# Patient Record
Sex: FEMALE | Race: WHITE | NOT HISPANIC OR LATINO | URBAN - METROPOLITAN AREA
[De-identification: names, ages, dates, MRNs, and addresses within clinical notes are randomized per-mention and may not be internally consistent; named-entity substitution may affect disease eponyms.]

---

## 2021-11-04 ENCOUNTER — OFFICE VISIT (OUTPATIENT)
Dept: PRIMARY CARE CLINIC | Facility: CLINIC | Age: 36
End: 2021-11-04
Payer: MEDICAID

## 2021-11-04 VITALS
DIASTOLIC BLOOD PRESSURE: 72 MMHG | SYSTOLIC BLOOD PRESSURE: 122 MMHG | WEIGHT: 293 LBS | OXYGEN SATURATION: 98 % | HEART RATE: 108 BPM | RESPIRATION RATE: 18 BRPM | BODY MASS INDEX: 43.4 KG/M2 | HEIGHT: 69 IN

## 2021-11-04 DIAGNOSIS — Z11.4 SCREENING FOR HIV (HUMAN IMMUNODEFICIENCY VIRUS): ICD-10-CM

## 2021-11-04 DIAGNOSIS — E66.01 CLASS 3 SEVERE OBESITY DUE TO EXCESS CALORIES WITHOUT SERIOUS COMORBIDITY WITH BODY MASS INDEX (BMI) OF 60.0 TO 69.9 IN ADULT: ICD-10-CM

## 2021-11-04 DIAGNOSIS — Z01.419 WELL WOMAN EXAM WITH ROUTINE GYNECOLOGICAL EXAM: ICD-10-CM

## 2021-11-04 DIAGNOSIS — Z00.00 ANNUAL PHYSICAL EXAM: Primary | ICD-10-CM

## 2021-11-04 DIAGNOSIS — Z11.59 NEED FOR HEPATITIS C SCREENING TEST: ICD-10-CM

## 2021-11-04 PROCEDURE — 99385 PREV VISIT NEW AGE 18-39: CPT | Mod: S$GLB,,, | Performed by: NURSE PRACTITIONER

## 2021-11-04 PROCEDURE — 99385 PR PREVENTIVE VISIT,NEW,18-39: ICD-10-PCS | Mod: S$GLB,,, | Performed by: NURSE PRACTITIONER

## 2021-11-04 RX ORDER — TETANUS TOXOID, REDUCED DIPHTHERIA TOXOID AND ACELLULAR PERTUSSIS VACCINE, ADSORBED 5; 2.5; 8; 8; 2.5 [IU]/.5ML; [IU]/.5ML; UG/.5ML; UG/.5ML; UG/.5ML
SUSPENSION INTRAMUSCULAR
COMMUNITY
End: 2022-01-31

## 2021-11-06 LAB
ALBUMIN SERPL-MCNC: 4.2 G/DL (ref 3.6–5.1)
ALBUMIN/GLOB SERPL: 1.4 (CALC) (ref 1–2.5)
ALP SERPL-CCNC: 73 U/L (ref 31–125)
ALT SERPL-CCNC: 11 U/L (ref 6–29)
AST SERPL-CCNC: 17 U/L (ref 10–30)
BASOPHILS # BLD AUTO: 32 CELLS/UL (ref 0–200)
BASOPHILS NFR BLD AUTO: 0.4 %
BILIRUB SERPL-MCNC: 0.4 MG/DL (ref 0.2–1.2)
BUN SERPL-MCNC: 15 MG/DL (ref 7–25)
BUN/CREAT SERPL: NORMAL (CALC) (ref 6–22)
CALCIUM SERPL-MCNC: 9.4 MG/DL (ref 8.6–10.2)
CHLORIDE SERPL-SCNC: 102 MMOL/L (ref 98–110)
CHOLEST SERPL-MCNC: 191 MG/DL
CHOLEST/HDLC SERPL: 3.2 (CALC)
CO2 SERPL-SCNC: 25 MMOL/L (ref 20–32)
CREAT SERPL-MCNC: 0.88 MG/DL (ref 0.5–1.1)
EOSINOPHIL # BLD AUTO: 71 CELLS/UL (ref 15–500)
EOSINOPHIL NFR BLD AUTO: 0.9 %
ERYTHROCYTE [DISTWIDTH] IN BLOOD BY AUTOMATED COUNT: 15 % (ref 11–15)
GLOBULIN SER CALC-MCNC: 3.1 G/DL (CALC) (ref 1.9–3.7)
GLUCOSE SERPL-MCNC: 92 MG/DL (ref 65–99)
HCT VFR BLD AUTO: 37.7 % (ref 35–45)
HCV AB S/CO SERPL IA: 0.02
HCV AB SERPL QL IA: NORMAL
HDLC SERPL-MCNC: 60 MG/DL
HGB BLD-MCNC: 12.3 G/DL (ref 11.7–15.5)
HIV 1+2 AB+HIV1 P24 AG SERPL QL IA: NORMAL
LDLC SERPL CALC-MCNC: 110 MG/DL (CALC)
LYMPHOCYTES # BLD AUTO: 1691 CELLS/UL (ref 850–3900)
LYMPHOCYTES NFR BLD AUTO: 21.4 %
MCH RBC QN AUTO: 26.6 PG (ref 27–33)
MCHC RBC AUTO-ENTMCNC: 32.6 G/DL (ref 32–36)
MCV RBC AUTO: 81.6 FL (ref 80–100)
MONOCYTES # BLD AUTO: 332 CELLS/UL (ref 200–950)
MONOCYTES NFR BLD AUTO: 4.2 %
NEUTROPHILS # BLD AUTO: 5775 CELLS/UL (ref 1500–7800)
NEUTROPHILS NFR BLD AUTO: 73.1 %
NONHDLC SERPL-MCNC: 131 MG/DL (CALC)
PLATELET # BLD AUTO: 214 THOUSAND/UL (ref 140–400)
PMV BLD REES-ECKER: 11.7 FL (ref 7.5–12.5)
POTASSIUM SERPL-SCNC: 4.4 MMOL/L (ref 3.5–5.3)
PROT SERPL-MCNC: 7.3 G/DL (ref 6.1–8.1)
RBC # BLD AUTO: 4.62 MILLION/UL (ref 3.8–5.1)
SODIUM SERPL-SCNC: 138 MMOL/L (ref 135–146)
TRIGL SERPL-MCNC: 104 MG/DL
TSH SERPL-ACNC: 3.46 MIU/L
WBC # BLD AUTO: 7.9 THOUSAND/UL (ref 3.8–10.8)

## 2021-11-09 ENCOUNTER — TELEPHONE (OUTPATIENT)
Dept: PRIMARY CARE CLINIC | Facility: CLINIC | Age: 36
End: 2021-11-09
Payer: MEDICAID

## 2021-12-01 ENCOUNTER — OFFICE VISIT (OUTPATIENT)
Dept: OBSTETRICS AND GYNECOLOGY | Facility: CLINIC | Age: 36
End: 2021-12-01
Payer: MEDICAID

## 2021-12-01 VITALS
WEIGHT: 293 LBS | SYSTOLIC BLOOD PRESSURE: 144 MMHG | HEIGHT: 69 IN | BODY MASS INDEX: 43.4 KG/M2 | DIASTOLIC BLOOD PRESSURE: 87 MMHG | HEART RATE: 108 BPM

## 2021-12-01 DIAGNOSIS — Z01.419 WELL WOMAN EXAM WITH ROUTINE GYNECOLOGICAL EXAM: Primary | ICD-10-CM

## 2021-12-01 DIAGNOSIS — Z86.718 PERSONAL HISTORY OF DVT (DEEP VEIN THROMBOSIS): ICD-10-CM

## 2021-12-01 DIAGNOSIS — E66.01 CLASS 3 SEVERE OBESITY DUE TO EXCESS CALORIES WITH BODY MASS INDEX (BMI) OF 60.0 TO 69.9 IN ADULT, UNSPECIFIED WHETHER SERIOUS COMORBIDITY PRESENT: ICD-10-CM

## 2021-12-01 DIAGNOSIS — E88.810 METABOLIC SYNDROME: ICD-10-CM

## 2021-12-01 DIAGNOSIS — N92.6 IRREGULAR MENSES: ICD-10-CM

## 2021-12-01 DIAGNOSIS — R87.613 HIGH GRADE SQUAMOUS INTRAEPITHELIAL LESION (HGSIL) ON CYTOLOGIC SMEAR OF CERVIX: ICD-10-CM

## 2021-12-01 PROBLEM — I82.403 DEEP VEIN THROMBOSIS (DVT) OF BOTH LOWER EXTREMITIES: Status: ACTIVE | Noted: 2021-12-01

## 2021-12-01 PROBLEM — E66.813 CLASS 3 SEVERE OBESITY DUE TO EXCESS CALORIES WITH BODY MASS INDEX (BMI) OF 60.0 TO 69.9 IN ADULT: Status: ACTIVE | Noted: 2021-12-01

## 2021-12-01 LAB
B-HCG UR QL: NEGATIVE
CTP QC/QA: YES

## 2021-12-01 PROCEDURE — 99385 PREV VISIT NEW AGE 18-39: CPT | Mod: S$GLB,,, | Performed by: NURSE PRACTITIONER

## 2021-12-01 PROCEDURE — 81025 POCT URINE PREGNANCY: ICD-10-PCS | Mod: S$GLB,,, | Performed by: NURSE PRACTITIONER

## 2021-12-01 PROCEDURE — 81025 URINE PREGNANCY TEST: CPT | Mod: S$GLB,,, | Performed by: NURSE PRACTITIONER

## 2021-12-01 PROCEDURE — 99385 PR PREVENTIVE VISIT,NEW,18-39: ICD-10-PCS | Mod: S$GLB,,, | Performed by: NURSE PRACTITIONER

## 2021-12-01 RX ORDER — METFORMIN HYDROCHLORIDE 500 MG/1
500 TABLET ORAL DAILY
Qty: 30 TABLET | Refills: 11 | Status: SHIPPED | OUTPATIENT
Start: 2021-12-01 | End: 2022-01-31 | Stop reason: SDUPTHER

## 2021-12-01 RX ORDER — LEVOTHYROXINE SODIUM 25 UG/1
25 TABLET ORAL
Qty: 30 TABLET | Refills: 11 | Status: SHIPPED | OUTPATIENT
Start: 2021-12-01 | End: 2024-01-02

## 2022-01-12 ENCOUNTER — PROCEDURE VISIT (OUTPATIENT)
Dept: OBSTETRICS AND GYNECOLOGY | Facility: CLINIC | Age: 37
End: 2022-01-12
Payer: MEDICAID

## 2022-01-12 VITALS
WEIGHT: 293 LBS | BODY MASS INDEX: 43.4 KG/M2 | DIASTOLIC BLOOD PRESSURE: 88 MMHG | HEART RATE: 99 BPM | HEIGHT: 69 IN | SYSTOLIC BLOOD PRESSURE: 144 MMHG

## 2022-01-12 DIAGNOSIS — R87.611 ATYPICAL SQUAMOUS CELLS CANNOT EXCLUDE HIGH GRADE SQUAMOUS INTRAEPITHELIAL LESION ON CYTOLOGIC SMEAR OF CERVIX (ASC-H): ICD-10-CM

## 2022-01-12 DIAGNOSIS — R87.613 HIGH GRADE SQUAMOUS INTRAEPITHELIAL LESION (HGSIL) ON CYTOLOGIC SMEAR OF CERVIX: ICD-10-CM

## 2022-01-12 DIAGNOSIS — Z32.02 NEGATIVE PREGNANCY TEST: Primary | ICD-10-CM

## 2022-01-12 LAB
B-HCG UR QL: NEGATIVE
CTP QC/QA: YES

## 2022-01-12 PROCEDURE — 99499 NO LOS: ICD-10-PCS | Mod: S$GLB,,, | Performed by: NURSE PRACTITIONER

## 2022-01-12 PROCEDURE — 57456 COLPOSCOPY: ICD-10-PCS | Mod: S$GLB,,, | Performed by: NURSE PRACTITIONER

## 2022-01-12 PROCEDURE — 81025 URINE PREGNANCY TEST: CPT | Mod: S$GLB,,, | Performed by: NURSE PRACTITIONER

## 2022-01-12 PROCEDURE — 99499 UNLISTED E&M SERVICE: CPT | Mod: S$GLB,,, | Performed by: NURSE PRACTITIONER

## 2022-01-12 PROCEDURE — 57456 ENDOCERV CURETTAGE W/SCOPE: CPT | Mod: S$GLB,,, | Performed by: NURSE PRACTITIONER

## 2022-01-12 PROCEDURE — 81025 POCT URINE PREGNANCY: ICD-10-PCS | Mod: S$GLB,,, | Performed by: NURSE PRACTITIONER

## 2022-01-12 NOTE — PROCEDURES
Colposcopy    Date/Time: 1/12/2022 9:00 AM  Performed by: Ayana Escalante NP  Authorized by: Ayana Escalante NP     Consent Done?:  Yes (Verbal) and Yes (Written)  Assistants?: Yes    List of assistants:  Fanny Huddleston I was present for the entire procedure.    Colposcopy Site:  Cervix  Position:  Supine   Patient was prepped and draped in the normal sterile fashion.  Acrowhite Lesion: No    Atypical Vessels: No    Transformation Zone Adequate?: Yes    Biopsy?: No    ECC Performed?: Yes    LEEP Performed?: No    Estimated blood loss (cc):  0   Patient tolerated the procedure well with no immediate complications.   Post-operative instructions were provided for the patient.   Patient was discharged and will follow up if any complications occur     Pt has a HX of abnormal PAP's now for over 10 yrs  Colpo times 2 in the past that returned WNL  2011 LG = HR HPV +, colpo WNL  2019 HGSIL while pregnant, Colpo done and endocervical polyp with benign findings  2021 ASCUS can not R/O HG with neg HPV     Metaplasia noted, but no significant lesions ID'd   ECC collected and will pap in one yr

## 2022-01-23 ENCOUNTER — PATIENT MESSAGE (OUTPATIENT)
Dept: OBSTETRICS AND GYNECOLOGY | Facility: CLINIC | Age: 37
End: 2022-01-23
Payer: MEDICAID

## 2022-01-31 ENCOUNTER — OFFICE VISIT (OUTPATIENT)
Dept: OBSTETRICS AND GYNECOLOGY | Facility: CLINIC | Age: 37
End: 2022-01-31
Payer: MEDICAID

## 2022-01-31 VITALS
WEIGHT: 293 LBS | SYSTOLIC BLOOD PRESSURE: 139 MMHG | DIASTOLIC BLOOD PRESSURE: 83 MMHG | HEIGHT: 69 IN | HEART RATE: 70 BPM | BODY MASS INDEX: 43.4 KG/M2

## 2022-01-31 DIAGNOSIS — E66.01 CLASS 3 SEVERE OBESITY DUE TO EXCESS CALORIES WITH BODY MASS INDEX (BMI) OF 60.0 TO 69.9 IN ADULT, UNSPECIFIED WHETHER SERIOUS COMORBIDITY PRESENT: ICD-10-CM

## 2022-01-31 DIAGNOSIS — N92.0 MENORRHAGIA WITH REGULAR CYCLE: ICD-10-CM

## 2022-01-31 DIAGNOSIS — N92.6 IRREGULAR MENSTRUAL BLEEDING: Primary | ICD-10-CM

## 2022-01-31 DIAGNOSIS — F33.1 MODERATE EPISODE OF RECURRENT MAJOR DEPRESSIVE DISORDER: ICD-10-CM

## 2022-01-31 DIAGNOSIS — Z86.718 HISTORY OF RECURRENT DEEP VEIN THROMBOSIS (DVT): ICD-10-CM

## 2022-01-31 DIAGNOSIS — E88.810 METABOLIC SYNDROME: ICD-10-CM

## 2022-01-31 DIAGNOSIS — E03.9 HYPOTHYROIDISM, UNSPECIFIED TYPE: ICD-10-CM

## 2022-01-31 PROCEDURE — 3075F PR MOST RECENT SYSTOLIC BLOOD PRESS GE 130-139MM HG: ICD-10-PCS | Mod: CPTII,S$GLB,, | Performed by: NURSE PRACTITIONER

## 2022-01-31 PROCEDURE — 1159F MED LIST DOCD IN RCRD: CPT | Mod: CPTII,S$GLB,, | Performed by: NURSE PRACTITIONER

## 2022-01-31 PROCEDURE — 3079F DIAST BP 80-89 MM HG: CPT | Mod: CPTII,S$GLB,, | Performed by: NURSE PRACTITIONER

## 2022-01-31 PROCEDURE — 99214 PR OFFICE/OUTPT VISIT, EST, LEVL IV, 30-39 MIN: ICD-10-PCS | Mod: S$GLB,,, | Performed by: NURSE PRACTITIONER

## 2022-01-31 PROCEDURE — 99214 OFFICE O/P EST MOD 30 MIN: CPT | Mod: S$GLB,,, | Performed by: NURSE PRACTITIONER

## 2022-01-31 PROCEDURE — 1160F PR REVIEW ALL MEDS BY PRESCRIBER/CLIN PHARMACIST DOCUMENTED: ICD-10-PCS | Mod: CPTII,S$GLB,, | Performed by: NURSE PRACTITIONER

## 2022-01-31 PROCEDURE — 3075F SYST BP GE 130 - 139MM HG: CPT | Mod: CPTII,S$GLB,, | Performed by: NURSE PRACTITIONER

## 2022-01-31 PROCEDURE — 3079F PR MOST RECENT DIASTOLIC BLOOD PRESSURE 80-89 MM HG: ICD-10-PCS | Mod: CPTII,S$GLB,, | Performed by: NURSE PRACTITIONER

## 2022-01-31 PROCEDURE — 3008F BODY MASS INDEX DOCD: CPT | Mod: CPTII,S$GLB,, | Performed by: NURSE PRACTITIONER

## 2022-01-31 PROCEDURE — 1160F RVW MEDS BY RX/DR IN RCRD: CPT | Mod: CPTII,S$GLB,, | Performed by: NURSE PRACTITIONER

## 2022-01-31 PROCEDURE — 1159F PR MEDICATION LIST DOCUMENTED IN MEDICAL RECORD: ICD-10-PCS | Mod: CPTII,S$GLB,, | Performed by: NURSE PRACTITIONER

## 2022-01-31 PROCEDURE — 3008F PR BODY MASS INDEX (BMI) DOCUMENTED: ICD-10-PCS | Mod: CPTII,S$GLB,, | Performed by: NURSE PRACTITIONER

## 2022-01-31 RX ORDER — METFORMIN HYDROCHLORIDE 500 MG/1
500 TABLET ORAL 2 TIMES DAILY WITH MEALS
Qty: 30 TABLET | Refills: 11 | Status: SHIPPED | OUTPATIENT
Start: 2022-01-31 | End: 2022-08-22

## 2022-01-31 RX ORDER — ASPIRIN 81 MG/1
81 TABLET ORAL DAILY
Qty: 150 TABLET | Refills: 2 | Status: SHIPPED | OUTPATIENT
Start: 2022-01-31 | End: 2024-01-02

## 2022-01-31 RX ORDER — FLUOXETINE 10 MG/1
10 CAPSULE ORAL DAILY
Qty: 30 CAPSULE | Refills: 2 | Status: SHIPPED | OUTPATIENT
Start: 2022-01-31 | End: 2022-04-25

## 2022-01-31 NOTE — PROGRESS NOTES
"  Subjective:       Patient ID: Viktoria Westfall is a 36 y.o. female.    Chief Complaint:  Menstrual Problem      History of Present Illness  Pt here for follow up in POC, taking the metformin without any issues, has lost 5#. Diet and exercise reviewed.  History and past labs reviewed with patient.  Discussed Depression HX, life long since starting cycles and has had episodes with each pregnancy   Complaints: cycles are lasting 14 days and coming monthly.       Review of Systems  Review of Systems   Genitourinary: Positive for menstrual problem.           Objective:     Vitals:    01/31/22 0855   BP: 139/83   Pulse: 70   Weight: (!) 187.7 kg (413 lb 12.8 oz)   Height: 5' 9" (1.753 m)        Physical Exam:   Constitutional: She is oriented to person, place, and time. She appears well-developed and well-nourished. No distress.    HENT:   Head: Normocephalic and atraumatic.      Cardiovascular: Normal rate and regular rhythm.  Exam reveals no clubbing, no cyanosis and no edema.          Abdominal: Soft. Bowel sounds are normal. She exhibits no distension. There is no abdominal tenderness. There is no rebound and no guarding.             Musculoskeletal: No edema.       Neurological: She is alert and oriented to person, place, and time.    Skin: Skin is warm and dry. No cyanosis or erythema. Nails show no clubbing.    Psychiatric: She has a normal mood and affect. Her behavior is normal.   Denies any SI or HI. Pt states this has been an ongoing issue since teen years and has had flairs with each delivery.  Pt is tearful and agrees to try low dose Prozac.  Pt states she felt too foggy with Wellbutrin           Assessment:     1. Irregular menstrual bleeding    2. History of recurrent deep vein thrombosis (DVT)    3. Class 3 severe obesity due to excess calories with body mass index (BMI) of 60.0 to 69.9 in adult, unspecified whether serious comorbidity present    4. Metabolic syndrome    5. Menorrhagia with regular cycle  "   6. Hypothyroidism, unspecified type    7. Moderate episode of recurrent major depressive disorder              Plan:       Irregular menstrual bleeding    History of recurrent deep vein thrombosis (DVT)  -     aspirin (ECOTRIN) 81 MG EC tablet; Take 1 tablet (81 mg total) by mouth once daily.  Dispense: 150 tablet; Refill: 2    Class 3 severe obesity due to excess calories with body mass index (BMI) of 60.0 to 69.9 in adult, unspecified whether serious comorbidity present  -     metFORMIN (GLUCOPHAGE) 500 MG tablet; Take 1 tablet (500 mg total) by mouth 2 (two) times daily with meals.  Dispense: 30 tablet; Refill: 11  -     FLUoxetine 10 MG capsule; Take 1 capsule (10 mg total) by mouth once daily.  Dispense: 30 capsule; Refill: 2    Metabolic syndrome  -     metFORMIN (GLUCOPHAGE) 500 MG tablet; Take 1 tablet (500 mg total) by mouth 2 (two) times daily with meals.  Dispense: 30 tablet; Refill: 11  -     FLUoxetine 10 MG capsule; Take 1 capsule (10 mg total) by mouth once daily.  Dispense: 30 capsule; Refill: 2    Menorrhagia with regular cycle  -     TSH w/reflex to Free T4; Future; Expected date: 01/31/2022  -     Hemoglobin and hematocrit, blood; Future; Expected date: 01/31/2022    Hypothyroidism, unspecified type  -     TSH w/reflex to Free T4; Future; Expected date: 01/31/2022  -     FLUoxetine 10 MG capsule; Take 1 capsule (10 mg total) by mouth once daily.  Dispense: 30 capsule; Refill: 2    Moderate episode of recurrent major depressive disorder  -     FLUoxetine 10 MG capsule; Take 1 capsule (10 mg total) by mouth once daily.  Dispense: 30 capsule; Refill: 2     Spent most of visit instructing pt on Diet and exercise with Depression counseling.     Follow up in about 3 months (around 4/30/2022).

## 2022-02-04 ENCOUNTER — OFFICE VISIT (OUTPATIENT)
Dept: PRIMARY CARE CLINIC | Facility: CLINIC | Age: 37
End: 2022-02-04
Payer: MEDICAID

## 2022-02-04 VITALS
DIASTOLIC BLOOD PRESSURE: 78 MMHG | RESPIRATION RATE: 18 BRPM | HEART RATE: 85 BPM | OXYGEN SATURATION: 97 % | SYSTOLIC BLOOD PRESSURE: 124 MMHG | WEIGHT: 293 LBS | BODY MASS INDEX: 43.4 KG/M2 | HEIGHT: 69 IN

## 2022-02-04 DIAGNOSIS — F32.89 OTHER DEPRESSION: ICD-10-CM

## 2022-02-04 DIAGNOSIS — E88.810 METABOLIC SYNDROME: Primary | ICD-10-CM

## 2022-02-04 DIAGNOSIS — E66.01 CLASS 3 SEVERE OBESITY DUE TO EXCESS CALORIES WITHOUT SERIOUS COMORBIDITY WITH BODY MASS INDEX (BMI) OF 60.0 TO 69.9 IN ADULT: ICD-10-CM

## 2022-02-04 DIAGNOSIS — E03.8 OTHER SPECIFIED HYPOTHYROIDISM: ICD-10-CM

## 2022-02-04 PROBLEM — F32.A DEPRESSION: Status: ACTIVE | Noted: 2022-02-04

## 2022-02-04 PROCEDURE — 3008F BODY MASS INDEX DOCD: CPT | Mod: CPTII,S$GLB,, | Performed by: NURSE PRACTITIONER

## 2022-02-04 PROCEDURE — 1160F PR REVIEW ALL MEDS BY PRESCRIBER/CLIN PHARMACIST DOCUMENTED: ICD-10-PCS | Mod: CPTII,S$GLB,, | Performed by: NURSE PRACTITIONER

## 2022-02-04 PROCEDURE — 99214 OFFICE O/P EST MOD 30 MIN: CPT | Mod: S$GLB,,, | Performed by: NURSE PRACTITIONER

## 2022-02-04 PROCEDURE — 1159F PR MEDICATION LIST DOCUMENTED IN MEDICAL RECORD: ICD-10-PCS | Mod: CPTII,S$GLB,, | Performed by: NURSE PRACTITIONER

## 2022-02-04 PROCEDURE — 3008F PR BODY MASS INDEX (BMI) DOCUMENTED: ICD-10-PCS | Mod: CPTII,S$GLB,, | Performed by: NURSE PRACTITIONER

## 2022-02-04 PROCEDURE — 3074F PR MOST RECENT SYSTOLIC BLOOD PRESSURE < 130 MM HG: ICD-10-PCS | Mod: CPTII,S$GLB,, | Performed by: NURSE PRACTITIONER

## 2022-02-04 PROCEDURE — 1159F MED LIST DOCD IN RCRD: CPT | Mod: CPTII,S$GLB,, | Performed by: NURSE PRACTITIONER

## 2022-02-04 PROCEDURE — 3078F PR MOST RECENT DIASTOLIC BLOOD PRESSURE < 80 MM HG: ICD-10-PCS | Mod: CPTII,S$GLB,, | Performed by: NURSE PRACTITIONER

## 2022-02-04 PROCEDURE — 3078F DIAST BP <80 MM HG: CPT | Mod: CPTII,S$GLB,, | Performed by: NURSE PRACTITIONER

## 2022-02-04 PROCEDURE — 3074F SYST BP LT 130 MM HG: CPT | Mod: CPTII,S$GLB,, | Performed by: NURSE PRACTITIONER

## 2022-02-04 PROCEDURE — 99214 PR OFFICE/OUTPT VISIT, EST, LEVL IV, 30-39 MIN: ICD-10-PCS | Mod: S$GLB,,, | Performed by: NURSE PRACTITIONER

## 2022-02-04 PROCEDURE — 1160F RVW MEDS BY RX/DR IN RCRD: CPT | Mod: CPTII,S$GLB,, | Performed by: NURSE PRACTITIONER

## 2022-02-04 NOTE — PATIENT INSTRUCTIONS
"RTC in 3 months for F/U or sooner if needed.    Keep appts with specialists as scheduled.    Instructed patient to report to nearest ER or call 911 if begins to have difficulty breathing, turning blue, chest pain, B/P < 80/60 or >170/100, palpitations, syncope, extreme weakness, or severe H/A. Patient verbalized understanding.     Patient Education       Low Cholesterol, Saturated Fat, and Trans Fat Diet   About this topic   Cholesterol, saturated fat, and trans fat are in many foods. These may raise your blood cholesterol levels. If your cholesterol is too high, this can cause health problems in your heart, liver, kidneys, and even your eyes. The key to lowering your risk of heart problems is to lower your bad fat intake.  Saturated fats and trans fats are the bad fats. These fats clog your arteries and raise your bad cholesterol. Saturated fats and trans fats are solid fats at room temperature. Saturated fats are animal fats. Trans fats are manmade fats. They add flavor to a lot of packaged foods. Staying away from saturated and trans fats will help your heart.  When you do eat foods with fat, make sure they have the good fats. Monounsaturated and polyunsaturated fats are good fats. These fats help raise your good cholesterol and protect your heart.  General   How to Lower Fat and Cholesterol in Your Diet   · Read the labels of the foods you buy from the market to find out how much fat is present. Under 5% of total fat on a label means it is "low fat". Over 20% of total fat on a label means it is high fat.  · Eat high fiber foods, like soluble fiber. This type of fiber helps lower cholesterol in the body. Choose oatmeal, fruits (like apples), beans, and nuts to get the most soluble fiber.  · Eat foods high in omega-3 fatty acids like lisa seeds, walnuts, salmon, tuna, trout, herring, flaxseed, and soybeans. These foods help keep the heart healthy.  · Limit your bad fat and oil intake.  · Stay away from butter, " stick margarine, shortening, lard, and palm and coconut oil. Pick plant-based spreads instead.  · Limit mayonnaise, salad dressings, gravies, and sauces, unless it is made from low-fat ingredients.  · Limit chocolate.  · Do not eat high-fat processed foods like hot dogs, aguilar, sausage, ham and other luncheon meats high in fat, and some frozen foods. Pick fish, chicken, turkey, and lean meats instead.  · Eat more dried beans, lentils, and tofu to get your protein.  · Do not eat organ meats, like liver.  · Choose nonfat or low-fat milk, yogurt, and cheese.  · Use light or fat-free cream cheese and sour cream.  · Eat lots of fruits and vegetables.  · Pick whole grain breads, cereals, pastas, and rice.  · Do not eat snacks that are high in fats like granola, cookies, pies, pastries, doughnuts, and croissants.  · Stay away from deep fried foods.  Help When Cooking   · Remove the fat portion of meats and the skin from poultry before cooking.  · Bake, broil, grill, poach, or roast poultry, fish, and lean meats.  · Drain and throw away the fat that drains out of meat as you cook it.  · Try to add little or no fat to foods.  · Use olive or canola oil for cooking or baking.  · Steam your vegetables.  · Use herbs or no-oil marinades to flavor foods.         Who should use this diet?   This diet is for people who are at high risk of getting health problems like heart disease, high blood pressure, diabetes, and others. This diet is also good for all people to follow to keep your heart healthy.  What foods are good to eat?   Foods with good fats are:  · Canola, peanut, and olive oil  · Safflower, soybean, and corn oil  · Walnuts, almonds, cashews, and peanuts  · Pumpkin and sunflower seeds  · [place] and tuna  · Tofu  · Soymilk  · Avocado  What foods should be limited or avoided?   Stay away from these types of foods that have saturated fats:  · Whole fat dairy products like cheese, ice cream, whole milk, and cream  · Palm and  coconut oils  · High-fat meats like beef, lamb, poultry with the skin, aguilar, and sausage  · Butter and lard  Stay away from these types of foods that may have trans fat:  · Cookies, cakes, candy, doughnuts, baked goods, muffins, pizza dough, and pie crusts that are packaged  · Fried foods  · Frozen dinners  · Chips and crackers  · Microwave popcorn  · Stick margarine and vegetable shortenings  Helpful tips   To help stay away from saturated fat:  · Pick lean cuts of meat  · Take the skin off chicken and turkey or pick skinless  · Pick low-fat cheese, milk, and ice cream  · Use liquid oils when cooking and baking, such as olive oil and canola oil  To help stay away from trans fat:  · Look at your labels. Choose foods with 0% trans fat. Read the ingredient list. Avoid foods with partially hydrogenated oil in the ingredient list. This means there is trans fat in the product.  Where can I learn more?   American Heart Association   http://www.heart.org/HEARTORG/Conditions/Cholesterol/PreventionTreatmentofHighCholesterol/Know-Your-Fats_[facility]_305628_Article.jsp   Last Reviewed Date   2021-10-05  Consumer Information Use and Disclaimer   This information is not specific medical advice and does not replace information you receive from your health care provider. This is only a brief summary of general information. It does NOT include all information about conditions, illnesses, injuries, tests, procedures, treatments, therapies, discharge instructions or life-style choices that may apply to you. You must talk with your health care provider for complete information about your health and treatment options. This information should not be used to decide whether or not to accept your health care providers advice, instructions or recommendations. Only your health care provider has the knowledge and training to provide advice that is right for you.   Copyright   Copyright © 2021 Up[employer] and its affiliates and/or licensors.  All rights reserved.    Patient Education       Depression Discharge Instructions, Adult   About this topic   Depression is different than normal sadness. Depression can make it hard for you to work, sleep, and enjoy life. Signs of depression include:  · No longer enjoying or caring about doing the things you used to.  · Feeling sad, down, hopeless, or cranky most of the day, almost every day.  · Losing or gaining weight without trying to do so.  · Sleeping too much or too little.  · Feeling tired or like you have no energy.  · Feeling guilty or like you are worth nothing.  · Forgetting things or feeling confused.  · Moving and speaking more slowly than usual.  · Acting restless or having trouble staying still.  Depression is caused by chemicals in your brain being out of balance. Treatment for depression may take a little while to start working.     What care is needed at home?   · Ask your doctor what you need to do when you go home. Make sure you ask questions if you do not understand what the doctor says.  · Go to counseling or support groups as suggested by your doctor.  · Avoid beer, wine, and mixed drinks. Also avoid marijuana and illegal drugs.  · Speak with trusted family and friends about your depression and how they can help.  · Exercise each day. Try to spend time outside each day. Sunshine may make you feel better.  · Have a regular sleep pattern and try to get 6 to 8 hours of sleep each night.  · Learn how to cope with stress. Guided imagery, yoga, kate chi, or deep breathing may help reduce your signs.  What follow-up care is needed?   Depression needs to be watched closely. Your doctor may ask you to make visits to the office to check on your progress. Be sure to keep these visits.  What drugs may be needed?   The doctor may order drugs to:  · Treat low mood  · Improve sleep  · Relieve distress and tension  Will physical activity be limited?   Physical activity like sports and exercise may help with  recovery. Talk to your doctor about what activity will be good. Ask your doctor if you need help to manage any tiredness the drugs may cause.  What changes to diet are needed?   Eating a healthy diet is important during this time. This means:  · Eat whole grain foods and foods high in fiber.  · Choose many different fruits and vegetables. Fresh or frozen is best.  · Eat less solid fats like butter or margarine. Eat less fatty or processed foods.  · Eat more low-fat or lean meats like chicken, fish, or turkey. Eat less red meat.  · Avoid caffeine. Try not to drink soda, coffee, tea, or energy drinks.  What can be done to prevent this health problem?   · Exercise each day.  · Try to spend time outside each day. Sunshine can make you feel better.  · Have a regular sleep pattern where you get 6 to 8 hours of sleep each night.  · Learn how to cope with stress. Guided imagery, yoga, kate chi, or deep breathing may help relieve your signs.  · Learn about depression and its signs. Then, you can get help early.  · Join a support group. Learn how others are living well with depression.  When do I need to call the doctor?   · You have thoughts of hurting yourself or someone else.  · Your depression does not get better within 1 or 2 weeks.  · Your depression is getting worse.  · Your family and friends say they are worried about you.  · You continue to have problems eating or sleeping.  · You are functioning poorly at work, at home, or in school.  Teach Back: Helping You Understand   The Teach Back Method helps you understand the information we are giving you. After you talk with the staff, tell them in your own words what you learned. This helps to make sure the staff has described each thing clearly. It also helps to explain things that may have been confusing. Before going home, make sure you can do these:  · I can tell you about my condition.  · I can tell you what may help me relax and ease my stress.  · I can tell you what  I will do if I think about hurting myself or someone else or if my depression is not getting better.  Where can I learn more?   American Psychiatric Association  https://www.psychiatry.org/patients-families/depression/what-is-depression   Centers for Disease Control and Prevention  https://www.cdc.gov/learnmorefeelbetter/programs/depression.htm   National Association of Mental Health  https://www.que.org/learn-more/mental-health-conditions/depression   [employer]  https://www.[employer]/contents/depression-in-adults-beyond-the-basics   Last Reviewed Date   2020-06-10  Consumer Information Use and Disclaimer   This information is not specific medical advice and does not replace information you receive from your health care provider. This is only a brief summary of general information. It does NOT include all information about conditions, illnesses, injuries, tests, procedures, treatments, therapies, discharge instructions or life-style choices that may apply to you. You must talk with your health care provider for complete information about your health and treatment options. This information should not be used to decide whether or not to accept your health care providers advice, instructions or recommendations. Only your health care provider has the knowledge and training to provide advice that is right for you.  Copyright   Copyright © 2021 [employer], [employer]. and its affiliates and/or licensors. All rights reserved.

## 2022-02-04 NOTE — PROGRESS NOTES
Subjective:       Patient ID: Viktoria Westfall is a 36 y.o. female.    Chief Complaint: Follow-up    HPI: Viktoria is a 36 y.o. female who presents for F/U.     Feels well today with no new issues or concerns.    Referred for Ayana Antonio NP for GYN. She is not on any birth control due to her having Factor 5 Leiden and developing a blood clot previously while on birth control. Takes Aspirin daily. Reports irregular menstrual cycles. PAP UTD, Colposcopy done as well due to previous her having abnormal PAPs in the past with a lesion which has been benign. Previously had spot found on cervix 2 years ago. Discussing possibility of partial hysterectomy in the near future but would like her to lose some weight first and take the Aspirin for some time prior to having surgery. Next appt in April.    Obesity - has battled obesity most of her life. Her BMI is currently 61.73 and she weighs 418 pounds. Has been eating healthier and also started Metformin 500 mg BID along with levothyroxine 25 mcg for borderline hypothyroidism. Referred to weight loss provider in [place] and is waiting on approval from insurance.    Also with some Depression which is now taking fluoxetine 10 mg daily and it is helping her.    Refuses COVID and FLU vaccines.            Past Medical History:   Diagnosis Date    Deep vein thrombosis     Factor 5 Leiden mutation, heterozygous        Past Surgical History:   Procedure Laterality Date    ADENOIDECTOMY       SECTION      DILATION AND CURETTAGE OF UTERUS      TONSILLECTOMY         Family History   Problem Relation Age of Onset    Diabetes Mother     Diabetes Maternal Uncle        Social History     Tobacco Use    Smoking status: Never Smoker    Smokeless tobacco: Never Used   Substance Use Topics    Alcohol use: Not Currently    Drug use: Never       Patient Active Problem List   Diagnosis    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 Negative for activity change, appetite change, chills, diaphoresis and fever.   HENT: Negative for tinnitus and trouble swallowing.    Eyes: Negative for pain and visual disturbance.   Respiratory: Negative for cough, chest tightness, shortness of breath and wheezing.    Cardiovascular: Negative for chest pain, palpitations and leg swelling.   Gastrointestinal: Negative for abdominal distention, abdominal pain, blood in stool, constipation, diarrhea, nausea and vomiting.   Endocrine: Negative for cold intolerance, heat intolerance, polydipsia, polyphagia and polyuria.   Genitourinary: Positive for menstrual problem. Negative for decreased urine volume, dysuria, frequency, hematuria and urgency.   Musculoskeletal: Negative for gait problem, neck pain and neck stiffness.   Skin: Negative for color change and rash.   Neurological: Negative for dizziness, syncope, weakness, light-headedness, numbness and headaches.   Psychiatric/Behavioral: Negative for behavioral problems, confusion and dysphoric mood. The patient is not nervous/anxious.      Objective:     Vitals:    02/04/22 0835   BP: 124/78   BP Location: Left arm   Patient Position: Sitting   BP Method: Large (Automatic)   Pulse: 85   Resp: 18   SpO2: 97%   Weight: (!) 187.3 kg (413 lb)   Height: 5' 9" (1.753 m)       Physical Exam  Vitals and nursing note reviewed.   Constitutional:       General: She is not in acute distress.     Appearance: Normal appearance. She is obese. She is not ill-appearing or diaphoretic.   HENT:      Head: Normocephalic and atraumatic.      Nose: Nose normal. "      Mouth/Throat:      Mouth: Mucous membranes are moist.      Pharynx: Oropharynx is clear.   Eyes:      Extraocular Movements: Extraocular movements intact.      Conjunctiva/sclera: Conjunctivae normal.      Pupils: Pupils are equal, round, and reactive to light.   Cardiovascular:      Rate and Rhythm: Normal rate and regular rhythm.      Pulses: Normal pulses.      Heart sounds: Normal heart sounds.   Pulmonary:      Effort: Pulmonary effort is normal.      Breath sounds: Normal breath sounds.   Abdominal:      General: Bowel sounds are normal. There is no distension.      Palpations: Abdomen is soft.      Tenderness: There is no abdominal tenderness.   Musculoskeletal:         General: No tenderness. Normal range of motion.      Cervical back: Normal range of motion and neck supple.      Right lower leg: No edema.      Left lower leg: No edema.   Lymphadenopathy:      Cervical: No cervical adenopathy.   Skin:     General: Skin is warm and dry.      Capillary Refill: Capillary refill takes less than 2 seconds.   Neurological:      General: No focal deficit present.      Mental Status: She is alert and oriented to person, place, and time.   Psychiatric:         Mood and Affect: Mood and affect normal.         Behavior: Behavior normal. Behavior is cooperative.         Procedure visit on 01/12/2022   Component Date Value Ref Range Status    POC Preg Test, Ur 01/12/2022 Negative  Negative Final     Acceptable 01/12/2022 Yes   Final   Office Visit on 12/01/2021   Component Date Value Ref Range Status    POC Preg Test, Ur 12/01/2021 Negative  Negative Final     Acceptable 12/01/2021 Yes   Final   Office Visit on 11/04/2021   Component Date Value Ref Range Status    Glucose 11/04/2021 92  65 - 99 mg/dL Final    BUN 11/04/2021 15  7 - 25 mg/dL Final    Creatinine 11/04/2021 0.88  0.50 - 1.10 mg/dL Final    eGFR if non African American 11/04/2021 86  > OR = 60 mL/min/1.73m2 Final     eGFR if African American 11/04/2021 99  > OR = 60 mL/min/1.73m2 Final    BUN/Creatinine Ratio 11/04/2021 NOT APPLICABLE  6 - 22 (calc) Final    Sodium 11/04/2021 138  135 - 146 mmol/L Final    Potassium 11/04/2021 4.4  3.5 - 5.3 mmol/L Final    Chloride 11/04/2021 102  98 - 110 mmol/L Final    CO2 11/04/2021 25  20 - 32 mmol/L Final    Calcium 11/04/2021 9.4  8.6 - 10.2 mg/dL Final    Total Protein 11/04/2021 7.3  6.1 - 8.1 g/dL Final    Albumin 11/04/2021 4.2  3.6 - 5.1 g/dL Final    Globulin, Total 11/04/2021 3.1  1.9 - 3.7 g/dL (calc) Final    Albumin/Globulin Ratio 11/04/2021 1.4  1.0 - 2.5 (calc) Final    Total Bilirubin 11/04/2021 0.4  0.2 - 1.2 mg/dL Final    Alkaline Phosphatase 11/04/2021 73  31 - 125 U/L Final    AST 11/04/2021 17  10 - 30 U/L Final    ALT 11/04/2021 11  6 - 29 U/L Final    WBC 11/04/2021 7.9  3.8 - 10.8 Thousand/uL Final    RBC 11/04/2021 4.62  3.80 - 5.10 Million/uL Final    Hemoglobin 11/04/2021 12.3  11.7 - 15.5 g/dL Final    Hematocrit 11/04/2021 37.7  35.0 - 45.0 % Final    MCV 11/04/2021 81.6  80.0 - 100.0 fL Final    MCH 11/04/2021 26.6* 27.0 - 33.0 pg Final    MCHC 11/04/2021 32.6  32.0 - 36.0 g/dL Final    RDW 11/04/2021 15.0  11.0 - 15.0 % Final    Platelets 11/04/2021 214  140 - 400 Thousand/uL Final    MPV 11/04/2021 11.7  7.5 - 12.5 fL Final    Neutrophils, Abs 11/04/2021 5,775  1,500 - 7,800 cells/uL Final    Lymph # 11/04/2021 1,691  850 - 3,900 cells/uL Final    Mono # 11/04/2021 332  200 - 950 cells/uL Final    Eos # 11/04/2021 71  15 - 500 cells/uL Final    Baso # 11/04/2021 32  0 - 200 cells/uL Final    Neutrophils Relative 11/04/2021 73.1  % Final    Lymph % 11/04/2021 21.4  % Final    Mono % 11/04/2021 4.2  % Final    Eosinophil % 11/04/2021 0.9  % Final    Basophil % 11/04/2021 0.4  % Final    TSH 11/04/2021 3.46  mIU/L Final    Cholesterol 11/04/2021 191  <200 mg/dL Final    HDL 11/04/2021 60  > OR = 50 mg/dL Final     Triglycerides 11/04/2021 104  <150 mg/dL Final    LDL Cholesterol 11/04/2021 110* mg/dL (calc) Final    HDL/Cholesterol Ratio 11/04/2021 3.2  <5.0 (calc) Final    Non HDL Chol. (LDL+VLDL) 11/04/2021 131* <130 mg/dL (calc) Final    HIV Ag/Ab 4th Gen 11/04/2021 NON-REACTIVE  NON-REACTIVE Final    Hepatitis C Ab 11/04/2021 NON-REACTIVE  NON-REACTIVE Final    Signal/Cutoff 11/04/2021 0.02  <1.00 Final         Assessment:      1. Metabolic syndrome    2. Class 3 severe obesity due to excess calories without serious comorbidity with body mass index (BMI) of 60.0 to 69.9 in adult    3. Other depression    4. Other specified hypothyroidism          Plan:     Metabolic syndrome    Class 3 severe obesity due to excess calories without serious comorbidity with body mass index (BMI) of 60.0 to 69.9 in adult    Other depression    Other specified hypothyroidism         Current Outpatient Medications   Medication Sig Dispense Refill    aspirin (ECOTRIN) 81 MG EC tablet Take 1 tablet (81 mg total) by mouth once daily. 150 tablet 2    FLUoxetine 10 MG capsule Take 1 capsule (10 mg total) by mouth once daily. 30 capsule 2    levothyroxine (SYNTHROID) 25 MCG tablet Take 1 tablet (25 mcg total) by mouth before breakfast. 30 tablet 11    metFORMIN (GLUCOPHAGE) 500 MG tablet Take 1 tablet (500 mg total) by mouth 2 (two) times daily with meals. 30 tablet 11     No current facility-administered medications for this visit.       There are no discontinued medications.    Health Maintenance   Topic Date Due    TETANUS VACCINE  10/21/2026    Hepatitis C Screening  Completed    Lipid Panel  Completed       Patient Instructions   RTC in 3 months for F/U or sooner if needed.    Keep appts with specialists as scheduled.    Instructed patient to report to nearest ER or call 911 if begins to have difficulty breathing, turning blue, chest pain, B/P < 80/60 or >170/100, palpitations, syncope, extreme weakness, or severe H/A. Patient  "verbalized understanding.     Patient Education       Low Cholesterol, Saturated Fat, and Trans Fat Diet   About this topic   Cholesterol, saturated fat, and trans fat are in many foods. These may raise your blood cholesterol levels. If your cholesterol is too high, this can cause health problems in your heart, liver, kidneys, and even your eyes. The key to lowering your risk of heart problems is to lower your bad fat intake.  Saturated fats and trans fats are the bad fats. These fats clog your arteries and raise your bad cholesterol. Saturated fats and trans fats are solid fats at room temperature. Saturated fats are animal fats. Trans fats are manmade fats. They add flavor to a lot of packaged foods. Staying away from saturated and trans fats will help your heart.  When you do eat foods with fat, make sure they have the good fats. Monounsaturated and polyunsaturated fats are good fats. These fats help raise your good cholesterol and protect your heart.  General   How to Lower Fat and Cholesterol in Your Diet   · Read the labels of the foods you buy from the market to find out how much fat is present. Under 5% of total fat on a label means it is "low fat". Over 20% of total fat on a label means it is high fat.  · Eat high fiber foods, like soluble fiber. This type of fiber helps lower cholesterol in the body. Choose oatmeal, fruits (like apples), beans, and nuts to get the most soluble fiber.  · Eat foods high in omega-3 fatty acids like lisa seeds, walnuts, salmon, tuna, trout, herring, flaxseed, and soybeans. These foods help keep the heart healthy.  · Limit your bad fat and oil intake.  · Stay away from butter, stick margarine, shortening, lard, and palm and coconut oil. Pick plant-based spreads instead.  · Limit mayonnaise, salad dressings, gravies, and sauces, unless it is made from low-fat ingredients.  · Limit chocolate.  · Do not eat high-fat processed foods like hot dogs, aguilar, sausage, ham and other " luncheon meats high in fat, and some frozen foods. Pick fish, chicken, turkey, and lean meats instead.  · Eat more dried beans, lentils, and tofu to get your protein.  · Do not eat organ meats, like liver.  · Choose nonfat or low-fat milk, yogurt, and cheese.  · Use light or fat-free cream cheese and sour cream.  · Eat lots of fruits and vegetables.  · Pick whole grain breads, cereals, pastas, and rice.  · Do not eat snacks that are high in fats like granola, cookies, pies, pastries, doughnuts, and croissants.  · Stay away from deep fried foods.  Help When Cooking   · Remove the fat portion of meats and the skin from poultry before cooking.  · Bake, broil, grill, poach, or roast poultry, fish, and lean meats.  · Drain and throw away the fat that drains out of meat as you cook it.  · Try to add little or no fat to foods.  · Use olive or canola oil for cooking or baking.  · Steam your vegetables.  · Use herbs or no-oil marinades to flavor foods.         Who should use this diet?   This diet is for people who are at high risk of getting health problems like heart disease, high blood pressure, diabetes, and others. This diet is also good for all people to follow to keep your heart healthy.  What foods are good to eat?   Foods with good fats are:  · Canola, peanut, and olive oil  · Safflower, soybean, and corn oil  · Walnuts, almonds, cashews, and peanuts  · Pumpkin and sunflower seeds  · [place] and tuna  · Tofu  · Soymilk  · Avocado  What foods should be limited or avoided?   Stay away from these types of foods that have saturated fats:  · Whole fat dairy products like cheese, ice cream, whole milk, and cream  · Palm and coconut oils  · High-fat meats like beef, lamb, poultry with the skin, aguilar, and sausage  · Butter and lard  Stay away from these types of foods that may have trans fat:  · Cookies, cakes, candy, doughnuts, baked goods, muffins, pizza dough, and pie crusts that are packaged  · Fried foods  · Frozen  dinners  · Chips and crackers  · Microwave popcorn  · Stick margarine and vegetable shortenings  Helpful tips   To help stay away from saturated fat:  · Pick lean cuts of meat  · Take the skin off chicken and turkey or pick skinless  · Pick low-fat cheese, milk, and ice cream  · Use liquid oils when cooking and baking, such as olive oil and canola oil  To help stay away from trans fat:  · Look at your labels. Choose foods with 0% trans fat. Read the ingredient list. Avoid foods with partially hydrogenated oil in the ingredient list. This means there is trans fat in the product.  Where can I learn more?   American Heart Association   http://www.heart.org/HEARTORG/Conditions/Cholesterol/PreventionTreatmentofHighCholesterol/Know-Your-Fats_[facility]_305628_Article.jsp   Last Reviewed Date   2021-10-05  Consumer Information Use and Disclaimer   This information is not specific medical advice and does not replace information you receive from your health care provider. This is only a brief summary of general information. It does NOT include all information about conditions, illnesses, injuries, tests, procedures, treatments, therapies, discharge instructions or life-style choices that may apply to you. You must talk with your health care provider for complete information about your health and treatment options. This information should not be used to decide whether or not to accept your health care providers advice, instructions or recommendations. Only your health care provider has the knowledge and training to provide advice that is right for you.   Copyright   Copyright © 2021 UpToDate, Inc. and its affiliates and/or licensors. All rights reserved.    Patient Education       Depression Discharge Instructions, Adult   About this topic   Depression is different than normal sadness. Depression can make it hard for you to work, sleep, and enjoy life. Signs of depression include:  · No longer enjoying or caring about doing the  things you used to.  · Feeling sad, down, hopeless, or cranky most of the day, almost every day.  · Losing or gaining weight without trying to do so.  · Sleeping too much or too little.  · Feeling tired or like you have no energy.  · Feeling guilty or like you are worth nothing.  · Forgetting things or feeling confused.  · Moving and speaking more slowly than usual.  · Acting restless or having trouble staying still.  Depression is caused by chemicals in your brain being out of balance. Treatment for depression may take a little while to start working.     What care is needed at home?   · Ask your doctor what you need to do when you go home. Make sure you ask questions if you do not understand what the doctor says.  · Go to counseling or support groups as suggested by your doctor.  · Avoid beer, wine, and mixed drinks. Also avoid marijuana and illegal drugs.  · Speak with trusted family and friends about your depression and how they can help.  · Exercise each day. Try to spend time outside each day. Sunshine may make you feel better.  · Have a regular sleep pattern and try to get 6 to 8 hours of sleep each night.  · Learn how to cope with stress. Guided imagery, yoga, kate chi, or deep breathing may help reduce your signs.  What follow-up care is needed?   Depression needs to be watched closely. Your doctor may ask you to make visits to the office to check on your progress. Be sure to keep these visits.  What drugs may be needed?   The doctor may order drugs to:  · Treat low mood  · Improve sleep  · Relieve distress and tension  Will physical activity be limited?   Physical activity like sports and exercise may help with recovery. Talk to your doctor about what activity will be good. Ask your doctor if you need help to manage any tiredness the drugs may cause.  What changes to diet are needed?   Eating a healthy diet is important during this time. This means:  · Eat whole grain foods and foods high in  fiber.  · Choose many different fruits and vegetables. Fresh or frozen is best.  · Eat less solid fats like butter or margarine. Eat less fatty or processed foods.  · Eat more low-fat or lean meats like chicken, fish, or turkey. Eat less red meat.  · Avoid caffeine. Try not to drink soda, coffee, tea, or energy drinks.  What can be done to prevent this health problem?   · Exercise each day.  · Try to spend time outside each day. Sunshine can make you feel better.  · Have a regular sleep pattern where you get 6 to 8 hours of sleep each night.  · Learn how to cope with stress. Guided imagery, yoga, kate chi, or deep breathing may help relieve your signs.  · Learn about depression and its signs. Then, you can get help early.  · Join a support group. Learn how others are living well with depression.  When do I need to call the doctor?   · You have thoughts of hurting yourself or someone else.  · Your depression does not get better within 1 or 2 weeks.  · Your depression is getting worse.  · Your family and friends say they are worried about you.  · You continue to have problems eating or sleeping.  · You are functioning poorly at work, at home, or in school.  Teach Back: Helping You Understand   The Teach Back Method helps you understand the information we are giving you. After you talk with the staff, tell them in your own words what you learned. This helps to make sure the staff has described each thing clearly. It also helps to explain things that may have been confusing. Before going home, make sure you can do these:  · I can tell you about my condition.  · I can tell you what may help me relax and ease my stress.  · I can tell you what I will do if I think about hurting myself or someone else or if my depression is not getting better.  Where can I learn more?   American Psychiatric Association  https://www.psychiatry.org/patients-families/depression/what-is-depression   Centers for Disease Control and  Prevention  https://www.cdc.gov/learnmorefeelbetter/programs/depression.htm   National Association of Mental Health  https://www.que.org/learn-more/mental-health-conditions/depression   UpToDate  https://www.[employer].com/contents/depression-in-adults-beyond-the-basics   Last Reviewed Date   2020-06-10  Consumer Information Use and Disclaimer   This information is not specific medical advice and does not replace information you receive from your health care provider. This is only a brief summary of general information. It does NOT include all information about conditions, illnesses, injuries, tests, procedures, treatments, therapies, discharge instructions or life-style choices that may apply to you. You must talk with your health care provider for complete information about your health and treatment options. This information should not be used to decide whether or not to accept your health care providers advice, instructions or recommendations. Only your health care provider has the knowledge and training to provide advice that is right for you.  Copyright   Copyright © 2021 UpToDate, Inc. and its affiliates and/or licensors. All rights reserved.          Risks, benefits, and alternatives discussed with patient, Patient verbalized understanding of discussed plan of care. Asked patient if any further questions, answered no.    Future Appointments   Date Time Provider Department Center   4/28/2022  9:45 AM Keeley Abdi NP [id number]   5/16/2022 10:20 AM Farheen Polo NP [facility] Tytal Polo NP

## 2022-04-29 ENCOUNTER — PATIENT MESSAGE (OUTPATIENT)
Dept: PRIMARY CARE CLINIC | Facility: CLINIC | Age: 37
End: 2022-04-29
Payer: MEDICAID

## 2022-09-29 ENCOUNTER — PATIENT MESSAGE (OUTPATIENT)
Dept: PRIMARY CARE CLINIC | Facility: CLINIC | Age: 37
End: 2022-09-29
Payer: MEDICAID

## 2022-10-25 ENCOUNTER — PATIENT MESSAGE (OUTPATIENT)
Dept: PRIMARY CARE CLINIC | Facility: CLINIC | Age: 37
End: 2022-10-25
Payer: MEDICAID

## 2023-07-19 ENCOUNTER — OFFICE VISIT (OUTPATIENT)
Dept: PRIMARY CARE CLINIC | Facility: CLINIC | Age: 38
End: 2023-07-19
Payer: MEDICAID

## 2023-07-19 VITALS
RESPIRATION RATE: 18 BRPM | HEIGHT: 69 IN | TEMPERATURE: 98 F | DIASTOLIC BLOOD PRESSURE: 78 MMHG | SYSTOLIC BLOOD PRESSURE: 132 MMHG | BODY MASS INDEX: 43.4 KG/M2 | WEIGHT: 293 LBS | HEART RATE: 83 BPM | OXYGEN SATURATION: 98 %

## 2023-07-19 DIAGNOSIS — E66.01 CLASS 3 SEVERE OBESITY DUE TO EXCESS CALORIES WITHOUT SERIOUS COMORBIDITY WITH BODY MASS INDEX (BMI) OF 60.0 TO 69.9 IN ADULT: ICD-10-CM

## 2023-07-19 DIAGNOSIS — R35.0 URINE FREQUENCY: ICD-10-CM

## 2023-07-19 DIAGNOSIS — E03.8 OTHER SPECIFIED HYPOTHYROIDISM: ICD-10-CM

## 2023-07-19 DIAGNOSIS — F32.A ANXIETY AND DEPRESSION: ICD-10-CM

## 2023-07-19 DIAGNOSIS — Z01.419 WELL WOMAN EXAM WITH ROUTINE GYNECOLOGICAL EXAM: ICD-10-CM

## 2023-07-19 DIAGNOSIS — R60.0 BILATERAL LEG EDEMA: ICD-10-CM

## 2023-07-19 DIAGNOSIS — Z00.00 ANNUAL PHYSICAL EXAM: Primary | ICD-10-CM

## 2023-07-19 DIAGNOSIS — E88.810 METABOLIC SYNDROME: ICD-10-CM

## 2023-07-19 DIAGNOSIS — Z86.718 HISTORY OF DVT (DEEP VEIN THROMBOSIS): ICD-10-CM

## 2023-07-19 DIAGNOSIS — F41.9 ANXIETY AND DEPRESSION: ICD-10-CM

## 2023-07-19 PROCEDURE — 3008F BODY MASS INDEX DOCD: CPT | Mod: CPTII,S$GLB,, | Performed by: NURSE PRACTITIONER

## 2023-07-19 PROCEDURE — 3075F SYST BP GE 130 - 139MM HG: CPT | Mod: CPTII,S$GLB,, | Performed by: NURSE PRACTITIONER

## 2023-07-19 PROCEDURE — 99395 PR PREVENTIVE VISIT,EST,18-39: ICD-10-PCS | Mod: S$GLB,,, | Performed by: NURSE PRACTITIONER

## 2023-07-19 PROCEDURE — 3075F PR MOST RECENT SYSTOLIC BLOOD PRESS GE 130-139MM HG: ICD-10-PCS | Mod: CPTII,S$GLB,, | Performed by: NURSE PRACTITIONER

## 2023-07-19 PROCEDURE — 3078F PR MOST RECENT DIASTOLIC BLOOD PRESSURE < 80 MM HG: ICD-10-PCS | Mod: CPTII,S$GLB,, | Performed by: NURSE PRACTITIONER

## 2023-07-19 PROCEDURE — 3008F PR BODY MASS INDEX (BMI) DOCUMENTED: ICD-10-PCS | Mod: CPTII,S$GLB,, | Performed by: NURSE PRACTITIONER

## 2023-07-19 PROCEDURE — 3078F DIAST BP <80 MM HG: CPT | Mod: CPTII,S$GLB,, | Performed by: NURSE PRACTITIONER

## 2023-07-19 PROCEDURE — 3044F HG A1C LEVEL LT 7.0%: CPT | Mod: CPTII,S$GLB,, | Performed by: NURSE PRACTITIONER

## 2023-07-19 PROCEDURE — 99395 PREV VISIT EST AGE 18-39: CPT | Mod: S$GLB,,, | Performed by: NURSE PRACTITIONER

## 2023-07-19 PROCEDURE — 3044F PR MOST RECENT HEMOGLOBIN A1C LEVEL <7.0%: ICD-10-PCS | Mod: CPTII,S$GLB,, | Performed by: NURSE PRACTITIONER

## 2023-07-19 NOTE — PROGRESS NOTES
Subjective:       Patient ID: Viktoria Westfall is a 38 y.o. female.    Chief Complaint: Annual Exam (Legs have been swelling constantly )    HPI: Viktoria is a 38 y.o. female who presents for annual visit.    C/O bilateral lower leg edema which has increased since last November. Denies CP or SOB. She says she was seen by a vein specialist and due to her damage from her DVT'S her blood pumps irregularly. Left leg is more swollen than the right. It has worsened since last November. She was treated for cellulitis to her right leg last year.    History of DVT which she takes Aspirin daily.    Referred for Ayana Antonio NP for GYN. She is not on any birth control due to her having Factor 5 Leiden and developing a blood clot previously while on birth control. Takes Aspirin daily. Reports irregular menstrual cycles. PAP UTD, Colposcopy done as well due to previous her having abnormal PAPs in the past with a lesion which has been benign. Previously had spot found on cervix 2 years ago. Discussing possibility of partial hysterectomy in the near future but would like her to lose some weight first and take the Aspirin for some time prior to having surgery. Next appt in April.    Obesity - has battled obesity most of her life. Her BMI is currently 61.73 and she weighs 418 pounds. Has been eating healthier and also started Metformin 500 mg BID along with levothyroxine 25 mcg for borderline hypothyroidism. Referred to weight loss provider in [place] and is waiting on approval from insurance.    Also with some Depression which is now taking fluoxetine 10 mg daily and it is helping her.    Refuses COVID and FLU vaccines.              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   HENT:  Negative for hearing loss, rhinorrhea and trouble swallowing.    Eyes:  Negative for discharge and visual disturbance.   Respiratory:  Negative for chest tightness and wheezing.    Cardiovascular:  Negative for chest pain and palpitations.   Gastrointestinal:  Negative for blood in stool, constipation, diarrhea and vomiting.   Endocrine: Negative for polydipsia and polyuria.   Genitourinary:  Negative for difficulty urinating, dysuria, hematuria and menstrual problem.   Musculoskeletal:  Negative for arthralgias, joint swelling and neck pain.   Neurological:  Negative for weakness and headaches.   Psychiatric/Behavioral:  Negative for confusion and dysphoric mood.      Objective:     Vitals:    07/19/23 1559   BP: 132/78   BP Location: Right arm   Patient Position: Sitting   BP Method: Medium (Automatic)   Pulse: 83   Resp: 18   Temp: 97.6 °F (36.4 °C)   TempSrc: Oral   SpO2: 98%   Weight: (!) 182.8 kg (403 lb)   Height: 5' 9" (1.753 m)       Physical Exam  Vitals and nursing note reviewed.   Constitutional:       General: She is not in acute distress.     Appearance: Normal appearance. She is obese. She is not diaphoretic.   HENT:      Head: " Normocephalic and atraumatic.      Nose: Nose normal.      Mouth/Throat:      Mouth: Mucous membranes are moist.      Pharynx: Oropharynx is clear.   Eyes:      General:         Right eye: No discharge.         Left eye: No discharge.      Extraocular Movements: Extraocular movements intact.      Conjunctiva/sclera: Conjunctivae normal.      Pupils: Pupils are equal, round, and reactive to light.   Neck:      Thyroid: No thyromegaly or thyroid tenderness.   Cardiovascular:      Rate and Rhythm: Normal rate and regular rhythm.      Pulses: Normal pulses.      Heart sounds: Normal heart sounds.   Pulmonary:      Effort: Pulmonary effort is normal.      Breath sounds: Normal breath sounds. No stridor. No wheezing or rales.   Abdominal:      General: Bowel sounds are normal. There is no distension.      Palpations: Abdomen is soft.      Tenderness: There is no abdominal tenderness. There is no guarding.   Musculoskeletal:         General: No tenderness. Normal range of motion.      Cervical back: Normal range of motion and neck supple.      Right lower leg: 3+ Edema present.      Left lower leg: 3+ Edema present.   Lymphadenopathy:      Cervical: No cervical adenopathy.   Skin:     General: Skin is warm and dry.      Capillary Refill: Capillary refill takes less than 2 seconds.      Coloration: Skin is not jaundiced.      Findings: No bruising, erythema or rash.   Neurological:      Mental Status: She is alert and oriented to person, place, and time.   Psychiatric:         Mood and Affect: Mood and affect normal.         Behavior: Behavior normal. Behavior is cooperative.         Thought Content: Thought content normal.         Judgment: Judgment normal.         Office Visit on 07/19/2023   Component Date Value Ref Range Status    WBC 07/20/2023 8.4  4.5 - 10.0 10*3/uL Final    RBC 07/20/2023 4.48  4.20 - 5.40 10*6/uL Final    Hemoglobin 07/20/2023 11.3 (L)  12.0 - 16.0 g/dL Final    Hematocrit 07/20/2023 36.8 (L)  37.0  - 47.0 % Final    MCV 07/20/2023 82.1  80.0 - 99.0 fL Final    MCH 07/20/2023 25.2 (L)  27.0 - 32.0 pg Final    MCHC 07/20/2023 30.7 (L)  32.0 - 36.0 % Final    RDW RBC Auto-Rto 07/20/2023 15.5  0.0 - 15.5 % Final    Platelets 07/20/2023 222  130 - 400 10*3/uL Final    MPV 07/20/2023 12.1  9.2 - 12.2 fL Final    Neutrophils 07/20/2023 69.3  50 - 80 % Final    Immature Granulocytes 07/20/2023 0.40  0.0 - 0.43 % Final    Lymphocytes 07/20/2023 24.3  20.0 - 45.0 % Final    Monocytes 07/20/2023 4.0  2 - 10 % Final    Eosinophils 07/20/2023 1.5  0 - 6 % Final    Basophils 07/20/2023 0.5  0 - 3 % Final    nRBC# 07/20/2023 0.0  0 - 0.2 % Final    Neutrophils Absolute 07/20/2023 5.8  1.4 - 7.0 10*3/uL Final    Immature Granulocytes Absolute 07/20/2023 0.0300  0.0 - 0.0310 thou/uL Final    Lymphocytes Absolute 07/20/2023 2.1  1.2 - 4.0 10*3/uL Final    Monocytes Absolute 07/20/2023 0.3  0.1 - 0.8 10*3/uL Final    Eosinophils Absolute 07/20/2023 0.1  0.0 - 0.6 10*3/uL Final    Basophils Absolute 07/20/2023 0.0  0.0 - 0.3 10*3/uL Final    nRBC Count Absolute 07/20/2023 0.000  0 - 0.012 thou/uL Final    Sodium 07/20/2023 135  131 - 143 mmol/L Final    Potassium 07/20/2023 4.5  3.5 - 5.1 mmol/L Final    Chloride 07/20/2023 104  98 - 107 mmol/L Final    CO2 07/20/2023 25  21 - 32 mmol/L Final    Anion Gap 07/20/2023 6.0  3.0 - 11.0 mmol/L Final    BUN 07/20/2023 12.0  7.0 - 18.0 mg/dL Final    Creatinine 07/20/2023 0.81  0.55 - 1.02 mg/dL Final    GFR ESTIMATION 07/20/2023 > 60  >60 Final    BUN/Creatinine Ratio 07/20/2023 14.81  Ratio Final    Glucose 07/20/2023 87  74 - 106 mg/dL Final    Calcium 07/20/2023 8.7  8.5 - 10.1 mg/dL Final    Total Bilirubin 07/20/2023 0.4  0.2 - 1.0 mg/dL Final    AST 07/20/2023 17  15 - 37 U/L Final    ALT 07/20/2023 19  13 - 56 U/L Final    Total Protein 07/20/2023 7.3  6.4 - 8.2 g/dL Final    Albumin 07/20/2023 3.3 (L)  3.4 - 5.0 g/dL Final    Alkaline Phosphatase 07/20/2023 76  45 - 117 U/L  Final    Estimated Avg Glucose 07/20/2023 126  mg/dL Final    Hemoglobin A1C 07/20/2023 5.7  4.2 - 6.3 % Final    Cholesterol 07/20/2023 175  <200 mg/dL Final    Triglycerides 07/20/2023 101  0 - 149 mg/dL Final    HDL 07/20/2023 54  >39 mg/dL Final    LDL Cholesterol 07/20/2023 100.8  mg/dL Final    VLDL 07/20/2023 20  mg/dL Final    Insulin 07/20/2023 20  uIU/mL Final    Free T4 07/20/2023 1.05  0.76 - 1.46 ng/dL Final    TSH 07/20/2023 3.420  0.358 - 3.74 uIU/mL Final         Assessment:      1. Annual physical exam    2. Metabolic syndrome    3. Class 3 severe obesity due to excess calories without serious comorbidity with body mass index (BMI) of 60.0 to 69.9 in adult    4. Other specified hypothyroidism    5. History of DVT (deep vein thrombosis)    6. Anxiety and depression    7. Bilateral leg edema    8. Well woman exam with routine gynecological exam    9. Urine frequency          Plan:     Annual physical exam  Comments:  Will review labs and determine POC based on results  Orders:  -     CBC Auto Differential; Future; Expected date: 07/19/2023  -     Comprehensive Metabolic Panel; Future; Expected date: 07/19/2023  -     Hemoglobin A1C; Future; Expected date: 07/19/2023  -     Lipid Panel; Future; Expected date: 07/19/2023  -     TSH; Future; Expected date: 07/19/2023    Metabolic syndrome  -     Insulin, random; Future; Expected date: 07/19/2023    Class 3 severe obesity due to excess calories without serious comorbidity with body mass index (BMI) of 60.0 to 69.9 in adult  -     Hemoglobin A1C; Future; Expected date: 07/19/2023  -     Lipid Panel; Future; Expected date: 07/19/2023    Other specified hypothyroidism  -     T4, Free; Future; Expected date: 07/19/2023  -     TSH; Future; Expected date: 07/19/2023    History of DVT (deep vein thrombosis)    Anxiety and depression    Bilateral leg edema    Well woman exam with routine gynecological exam  -     Ambulatory referral/consult to Obstetrics /  Gynecology; Future; Expected date: 07/26/2023    Urine frequency  -     Urinalysis, Reflex to Urine Culture Urine, Clean Catch; Future; Expected date: 07/19/2023    Other orders  -     TSH+Free T4         Current Outpatient Medications   Medication Sig Dispense Refill    aspirin (ECOTRIN) 81 MG EC tablet Take 1 tablet (81 mg total) by mouth once daily. 150 tablet 2    FLUoxetine 10 MG capsule Take 1 capsule (10 mg total) by mouth once daily. 90 capsule 3    levothyroxine (SYNTHROID) 25 MCG tablet Take 1 tablet (25 mcg total) by mouth before breakfast. 30 tablet 11    ferrous sulfate (FEOSOL) 325 mg (65 mg iron) Tab tablet Take 1 tablet (325 mg total) by mouth daily with breakfast. 90 tablet 0    metFORMIN (GLUCOPHAGE) 500 MG tablet TAKE 1 TABLET BY MOUTH TWICE A DAY WITH MEALS 180 tablet 1     No current facility-administered medications for this visit.       There are no discontinued medications.    Health Maintenance   Topic Date Due    TETANUS VACCINE  10/21/2026    Hepatitis C Screening  Completed    Lipid Panel  Completed       Patient Instructions   RTC in 2 months for F/U or sooner if needed.    Patient Education       Yearly Physical for Adults   About this topic   Most people do not want to be sick. Having a checkup each year with your doctor is one way to help you stay healthy. You may need to see your doctor more or less often. How often you need to go to the doctor depends on your age. Your family and medical history also play a role in how often you need to go to the doctor. Going to see your doctor on a routine basis can help you find problems early or even before they start. This may make it easier to treat or cure your problem.  General   Your doctor will talk about many things during your checkup. Your doctor may ask about:  Your medical and family history.  All the drugs you are taking. Be sure to include all prescription, over the counter, and herbal supplements. Tell the doctor if you have any  drug allergy. Bring a list of drugs you take with you.  How you are feeling and if you are having any problems.  Risky behaviors like smoking, drinking alcohol, using illegal drugs, not wearing seatbelts, having unprotected sex, etc.  Your doctor will do a physical exam and may check your:  Height and weight  Blood pressure  Reflexes  Memory  Vision  Hearing  Your doctor may order:  Lab tests  ECG to check your heart rhythm  X-rays  Tests or treatments based on your exam  What lifestyle changes are needed?   Your doctor may suggest you make changes to your lifestyle at this visit. The doctor may talk with you about being more active or lowering stress levels. Ask your doctor what you need to do.  What drugs may be needed?   Your doctor may order drugs or vaccines to protect you from illnesses.  What changes to diet are needed?   Talk to your doctor to see if any changes are needed to your diet.  When do I need to call the doctor?   Call your doctor if you need to learn about any test results. Together you can make a plan for more care.  Helpful tips   Make a list of questions for your doctor before you go. This will help you remember to ask about any concerns. Write down any answers from your doctor so you can look over them after your visit.   Tell your doctor about any changes in your body or health since your last visit.  Ask your doctor about any screening tests you need.  Where can I learn more?   American Academy [employer]  http://familydoctor.org/familydoctor/en/prevention-wellness/staying-healthy/healthy-living/preventive-services-for-healthy-living.printerview.html   Centers for Disease Control  http://www.cdc.gov/family/checkup/   Last Reviewed Date   2019-04-22  Consumer Information Use and Disclaimer   This information is not specific medical advice and does not replace information you receive from your health care provider. This is only a brief summary of general information. It does NOT  include all information about conditions, illnesses, injuries, tests, procedures, treatments, therapies, discharge instructions or life-style choices that may apply to you. You must talk with your health care provider for complete information about your health and treatment options. This information should not be used to decide whether or not to accept your health care providers advice, instructions or recommendations. Only your health care provider has the knowledge and training to provide advice that is right for you.  Copyright   Copyright © 2021 [employer] and its affiliates and/or licensors. All rights reserved.      Risks, benefits, and alternatives discussed with patient, Patient verbalized understanding of discussed plan of care. Asked patient if any further questions, answered no.    Future Appointments   Date Time Provider Department Center   9/11/2023  9:00 AM Keeley Abdi NP [id number]   9/25/2023 10:20 AM [id number] Polo NP

## 2023-07-19 NOTE — PATIENT INSTRUCTIONS
RTC in 2 months for F/U or sooner if needed.    Patient Education       Yearly Physical for Adults   About this topic   Most people do not want to be sick. Having a checkup each year with your doctor is one way to help you stay healthy. You may need to see your doctor more or less often. How often you need to go to the doctor depends on your age. Your family and medical history also play a role in how often you need to go to the doctor. Going to see your doctor on a routine basis can help you find problems early or even before they start. This may make it easier to treat or cure your problem.  General   Your doctor will talk about many things during your checkup. Your doctor may ask about:  Your medical and family history.  All the drugs you are taking. Be sure to include all prescription, over the counter, and herbal supplements. Tell the doctor if you have any drug allergy. Bring a list of drugs you take with you.  How you are feeling and if you are having any problems.  Risky behaviors like smoking, drinking alcohol, using illegal drugs, not wearing seatbelts, having unprotected sex, etc.  Your doctor will do a physical exam and may check your:  Height and weight  Blood pressure  Reflexes  Memory  Vision  Hearing  Your doctor may order:  Lab tests  ECG to check your heart rhythm  X-rays  Tests or treatments based on your exam  What lifestyle changes are needed?   Your doctor may suggest you make changes to your lifestyle at this visit. The doctor may talk with you about being more active or lowering stress levels. Ask your doctor what you need to do.  What drugs may be needed?   Your doctor may order drugs or vaccines to protect you from illnesses.  What changes to diet are needed?   Talk to your doctor to see if any changes are needed to your diet.  When do I need to call the doctor?   Call your doctor if you need to learn about any test results. Together you can make a plan for more care.  Helpful tips   Make a  list of questions for your doctor before you go. This will help you remember to ask about any concerns. Write down any answers from your doctor so you can look over them after your visit.   Tell your doctor about any changes in your body or health since your last visit.  Ask your doctor about any screening tests you need.  Where can I learn more?   American Academy of Family Physicians  http://familydoctor.org/familydoctor/en/prevention-wellness/staying-healthy/healthy-living/preventive-services-for-healthy-living.printerview.html   Centers for Disease Control  http://www.cdc.gov/family/checkup/   Last Reviewed Date   2019-04-22  Consumer Information Use and Disclaimer   This information is not specific medical advice and does not replace information you receive from your health care provider. This is only a brief summary of general information. It does NOT include all information about conditions, illnesses, injuries, tests, procedures, treatments, therapies, discharge instructions or life-style choices that may apply to you. You must talk with your health care provider for complete information about your health and treatment options. This information should not be used to decide whether or not to accept your health care providers advice, instructions or recommendations. Only your health care provider has the knowledge and training to provide advice that is right for you.  Copyright   Copyright © 2021 [employer] and its affiliates and/or licensors. All rights reserved.

## 2023-07-20 LAB
ABS NRBC COUNT: 0 THOU/UL (ref 0–0.01)
ABSOLUTE BASOPHIL: 0 10*3/UL (ref 0–0.3)
ABSOLUTE EOSINOPHIL: 0.1 10*3/UL (ref 0–0.6)
ABSOLUTE IMMATURE GRAN: 0.03 THOU/UL (ref 0–0.03)
ABSOLUTE LYMPHOCYTE: 2.1 10*3/UL (ref 1.2–4)
ABSOLUTE MONOCYTE: 0.3 10*3/UL (ref 0.1–0.8)
ALBUMIN SERPL BCP-MCNC: 3.3 G/DL (ref 3.4–5)
ALP SERPL-CCNC: 76 U/L (ref 45–117)
ALT SERPL W P-5'-P-CCNC: 19 U/L (ref 13–56)
ANION GAP SERPL CALC-SCNC: 6 MMOL/L (ref 3–11)
APPEARANCE, UA: CLEAR
AST SERPL-CCNC: 17 U/L (ref 15–37)
BACTERIA SPEC CULT: ABNORMAL /HPF
BASOPHILS NFR BLD: 0.5 % (ref 0–3)
BILIRUB SERPL-MCNC: 0.4 MG/DL (ref 0.2–1)
BILIRUB UR QL STRIP: NEGATIVE
BUN SERPL-MCNC: 12 MG/DL (ref 7–18)
BUN/CREAT SERPL: 14.81 RATIO
CALCIUM SERPL-MCNC: 8.7 MG/DL (ref 8.5–10.1)
CHLORIDE SERPL-SCNC: 104 MMOL/L (ref 98–107)
CHOLEST SERPL-MSCNC: 175 MG/DL
CO2 SERPL-SCNC: 25 MMOL/L (ref 21–32)
COLOR UR: ABNORMAL
CREAT SERPL-MCNC: 0.81 MG/DL (ref 0.55–1.02)
CRYSTALS, URINE: ABNORMAL /LPF
EOSINOPHIL NFR BLD: 1.5 % (ref 0–6)
ERYTHROCYTE [DISTWIDTH] IN BLOOD BY AUTOMATED COUNT: 15.5 % (ref 0–15.5)
ESTIMATED AVG GLUCOSE: 126 MG/DL
GFR ESTIMATION: > 60
GLUCOSE (UA): NEGATIVE MG/DL
GLUCOSE SERPL-MCNC: 87 MG/DL (ref 74–106)
HBA1C MFR BLD: 5.7 % (ref 4.2–6.3)
HCT VFR BLD AUTO: 36.8 % (ref 37–47)
HDLC SERPL-MCNC: 54 MG/DL
HGB BLD-MCNC: 11.3 G/DL (ref 12–16)
HGB UR QL STRIP: ABNORMAL
IMMATURE GRANULOCYTES: 0.4 % (ref 0–0.43)
KETONES UR QL STRIP: NEGATIVE MG/DL
LDLC SERPL CALC-MCNC: 100.8 MG/DL
LEUKOCYTE ESTERASE UR QL STRIP: 75 LEU/UL
LYMPHOCYTES NFR BLD: 24.3 % (ref 20–45)
MCH RBC QN AUTO: 25.2 PG (ref 27–32)
MCHC RBC AUTO-ENTMCNC: 30.7 % (ref 32–36)
MCV RBC AUTO: 82.1 FL (ref 80–99)
MONOCYTES NFR BLD: 4 % (ref 2–10)
MUCUS URINE: ABNORMAL /LPF
NEUTROPHILS # BLD AUTO: 5.8 10*3/UL (ref 1.4–7)
NEUTROPHILS NFR BLD: 69.3 % (ref 50–80)
NITRITE UR QL STRIP: NEGATIVE
NUCLEATED RED BLOOD CELLS: 0 % (ref 0–0.2)
PH UR STRIP: 5.5 PH (ref 5–8)
PLATELETS: 222 10*3/UL (ref 130–400)
PMV BLD AUTO: 12.1 FL (ref 9.2–12.2)
POTASSIUM SERPL-SCNC: 4.5 MMOL/L (ref 3.5–5.1)
PROT SERPL-MCNC: 7.3 G/DL (ref 6.4–8.2)
PROT UR QL STRIP: NEGATIVE MG/DL
RBC # BLD AUTO: 4.48 10*6/UL (ref 4.2–5.4)
RBC #/AREA URNS HPF: ABNORMAL /HPF (ref 0–2)
SERVICE COMMENT 03: ABNORMAL
SODIUM BLD-SCNC: 135 MMOL/L (ref 131–143)
SP GR UR STRIP: 1.01 (ref 1–1.03)
SQUAMOUS EPITHELIAL, UA: ABNORMAL /LPF
T4 FREE SP9 P CHAL SERPL-SCNC: 1.05 NG/DL (ref 0.76–1.46)
TRIGL SERPL-MCNC: 101 MG/DL (ref 0–149)
TSH SERPL DL<=0.005 MIU/L-ACNC: 3.42 UIU/ML (ref 0.36–3.74)
UROBILINOGEN UR STRIP-ACNC: NORMAL MG/DL
VLDL CHOLESTEROL: 20 MG/DL
WBC # BLD: 8.4 10*3/UL (ref 4.5–10)
WBC #/AREA URNS HPF: ABNORMAL /HPF (ref 0–2)

## 2023-07-21 LAB — INSULIN AB SER QL: 20 UIU/ML

## 2023-07-27 DIAGNOSIS — I89.0 LYMPHEDEMA OF BOTH LOWER EXTREMITIES: ICD-10-CM

## 2023-07-27 DIAGNOSIS — D50.0 IRON DEFICIENCY ANEMIA DUE TO CHRONIC BLOOD LOSS: Primary | ICD-10-CM

## 2023-07-27 RX ORDER — FERROUS SULFATE 325(65) MG
325 TABLET ORAL
Qty: 90 TABLET | Refills: 0 | Status: SHIPPED | OUTPATIENT
Start: 2023-07-27 | End: 2023-09-21 | Stop reason: SDUPTHER

## 2023-07-28 ENCOUNTER — TELEPHONE (OUTPATIENT)
Dept: PRIMARY CARE CLINIC | Facility: CLINIC | Age: 38
End: 2023-07-28
Payer: MEDICAID

## 2023-07-28 NOTE — TELEPHONE ENCOUNTER
----- Message from Farheen Polo NP sent at 7/27/2023 11:07 PM CDT -----  Please notify patient her blood work shows she is anemic so I have ordered iron tablets to her pharmacy to take once daily. Also, her urine shows a large amount of blood so ask her if she was on her menstrual cycle at the time she gave the urine sample because if not, we will have to see why she has blood in her urine. Let me know what she says about the blood in her urine. All of her other blood work is within normal range. Also, let Ms. Westfall know I have referred her to an [facility] outpatient therapy for evaluation and treatment of her lymphedema of her legs so she will receive a call within a week to schedule an appt. We will discuss all of her lab results in detail at her next visit. Thanks.

## 2023-07-28 NOTE — PROGRESS NOTES
Please notify patient her blood work shows she is anemic so I have ordered iron tablets to her pharmacy to take once daily. Also, her urine shows a large amount of blood so ask her if she was on her menstrual cycle at the time she gave the urine sample because if not, we will have to see why she has blood in her urine. Let me know what she says about the blood in her urine. All of her other blood work is within normal range. Also, let Ms. Westfall know I have referred her to an [facility] outpatient therapy for evaluation and treatment of her lymphedema of her legs so she will receive a call within a week to schedule an appt. We will discuss all of her lab results in detail at her next visit. Thanks.

## 2023-09-11 ENCOUNTER — OFFICE VISIT (OUTPATIENT)
Dept: OBSTETRICS AND GYNECOLOGY | Facility: CLINIC | Age: 38
End: 2023-09-11
Payer: MEDICAID

## 2023-09-11 VITALS
BODY MASS INDEX: 43.4 KG/M2 | SYSTOLIC BLOOD PRESSURE: 159 MMHG | HEIGHT: 69 IN | DIASTOLIC BLOOD PRESSURE: 84 MMHG | HEART RATE: 80 BPM | WEIGHT: 293 LBS

## 2023-09-11 DIAGNOSIS — R03.0 ELEVATED BLOOD-PRESSURE READING WITHOUT DIAGNOSIS OF HYPERTENSION: ICD-10-CM

## 2023-09-11 DIAGNOSIS — Z01.419 WELL WOMAN EXAM WITH ROUTINE GYNECOLOGICAL EXAM: Primary | ICD-10-CM

## 2023-09-11 DIAGNOSIS — R87.611 ATYPICAL SQUAMOUS CELLS CANNOT EXCLUDE HIGH GRADE SQUAMOUS INTRAEPITHELIAL LESION ON CYTOLOGIC SMEAR OF CERVIX (ASC-H): ICD-10-CM

## 2023-09-11 PROCEDURE — 3079F DIAST BP 80-89 MM HG: CPT | Mod: CPTII,S$GLB,, | Performed by: NURSE PRACTITIONER

## 2023-09-11 PROCEDURE — 3008F BODY MASS INDEX DOCD: CPT | Mod: CPTII,S$GLB,, | Performed by: NURSE PRACTITIONER

## 2023-09-11 PROCEDURE — 3008F PR BODY MASS INDEX (BMI) DOCUMENTED: ICD-10-PCS | Mod: CPTII,S$GLB,, | Performed by: NURSE PRACTITIONER

## 2023-09-11 PROCEDURE — 3079F PR MOST RECENT DIASTOLIC BLOOD PRESSURE 80-89 MM HG: ICD-10-PCS | Mod: CPTII,S$GLB,, | Performed by: NURSE PRACTITIONER

## 2023-09-11 PROCEDURE — 99395 PREV VISIT EST AGE 18-39: CPT | Mod: S$GLB,,, | Performed by: NURSE PRACTITIONER

## 2023-09-11 PROCEDURE — 3044F HG A1C LEVEL LT 7.0%: CPT | Mod: CPTII,S$GLB,, | Performed by: NURSE PRACTITIONER

## 2023-09-11 PROCEDURE — 3077F PR MOST RECENT SYSTOLIC BLOOD PRESSURE >= 140 MM HG: ICD-10-PCS | Mod: CPTII,S$GLB,, | Performed by: NURSE PRACTITIONER

## 2023-09-11 PROCEDURE — 1159F PR MEDICATION LIST DOCUMENTED IN MEDICAL RECORD: ICD-10-PCS | Mod: CPTII,S$GLB,, | Performed by: NURSE PRACTITIONER

## 2023-09-11 PROCEDURE — 1160F RVW MEDS BY RX/DR IN RCRD: CPT | Mod: CPTII,S$GLB,, | Performed by: NURSE PRACTITIONER

## 2023-09-11 PROCEDURE — 1159F MED LIST DOCD IN RCRD: CPT | Mod: CPTII,S$GLB,, | Performed by: NURSE PRACTITIONER

## 2023-09-11 PROCEDURE — 3044F PR MOST RECENT HEMOGLOBIN A1C LEVEL <7.0%: ICD-10-PCS | Mod: CPTII,S$GLB,, | Performed by: NURSE PRACTITIONER

## 2023-09-11 PROCEDURE — 3077F SYST BP >= 140 MM HG: CPT | Mod: CPTII,S$GLB,, | Performed by: NURSE PRACTITIONER

## 2023-09-11 PROCEDURE — 1160F PR REVIEW ALL MEDS BY PRESCRIBER/CLIN PHARMACIST DOCUMENTED: ICD-10-PCS | Mod: CPTII,S$GLB,, | Performed by: NURSE PRACTITIONER

## 2023-09-11 PROCEDURE — 99395 PR PREVENTIVE VISIT,EST,18-39: ICD-10-PCS | Mod: S$GLB,,, | Performed by: NURSE PRACTITIONER

## 2023-09-11 NOTE — PROGRESS NOTES
"    Subjective:       Patient ID: Viktoria Westfall is a 38 y.o. female.    Chief Complaint:  Well Woman      History of Present Illness   Presents for annual gyn exam. History and past labs reviewed with patient.    Denies any complaints.    No history of abnormal Pap smears  Sexually active with    Not using contraception.  Last Pap smear abnormal with a negative colposcopy  Patient's last menstrual period was 2023 (exact date).      OB History          4    Para   3    Term   3            AB   1    Living   3         SAB        IAB        Ectopic        Multiple        Live Births   3                  Review of Systems  Review of Systems   Constitutional:  Negative for chills and fever.   Respiratory:  Negative for shortness of breath.    Cardiovascular:  Negative for chest pain.   Gastrointestinal:  Negative for abdominal pain, blood in stool, constipation, diarrhea, nausea, vomiting and reflux.   Genitourinary:  Negative for dysmenorrhea, dyspareunia, dysuria, hematuria, hot flashes, menorrhagia, menstrual problem, pelvic pain, vaginal bleeding, vaginal discharge, postcoital bleeding and vaginal dryness.   Musculoskeletal:  Negative for arthralgias and joint swelling.   Integumentary:  Negative for rash, hair changes, breast mass, nipple discharge and breast skin changes.   Psychiatric/Behavioral:  Negative for depression. The patient is not nervous/anxious.    Breast: Negative for asymmetry, lump, mass, nipple discharge and skin changes          Objective:     Vitals:    23 0859   BP: (!) 159/84   Pulse: 80   Weight: (!) 180.6 kg (398 lb 4 oz)   Height: 5' 9" (1.753 m)        Physical Exam:   Constitutional: She is oriented to person, place, and time. She appears well-developed and well-nourished.    HENT:   Head: Normocephalic and atraumatic.    Eyes: Pupils are equal, round, and reactive to light. Conjunctivae are normal.    Neck: No thyromegaly present.    Cardiovascular:  " Normal rate, regular rhythm and normal heart sounds.             Pulmonary/Chest: Effort normal and breath sounds normal.        Abdominal: Soft.     Genitourinary:    Inguinal canal, vagina, uterus, right adnexa, left adnexa and rectum normal.      Pelvic exam was performed with patient supine.   The external female genitalia was normal.   Genitalia hair distrobution normal .   Labial bartholins normal.Cervix is normal. No  no vaginal discharge in the vagina.    pap smear completed   Genitourinary Comments: Palpation difficult related to habitu             Musculoskeletal: Normal range of motion and moves all extremeties.       Neurological: She is alert and oriented to person, place, and time. She has normal reflexes.    Skin: Skin is warm and dry.    Psychiatric: She has a normal mood and affect. Her behavior is normal. Judgment and thought content normal.       breast exam wnl- no nipple dc, skin changes, masses or lymph nodes palpated bilaterally    Assessment:     1. Well woman exam with routine gynecological exam    2. Atypical squamous cells cannot exclude high grade squamous intraepithelial lesion on cytologic smear of cervix (ASC-H)    3. Elevated blood-pressure reading without diagnosis of hypertension              Plan:       Well woman exam with routine gynecological exam  -     Ambulatory referral/consult to Obstetrics / Gynecology    Atypical squamous cells cannot exclude high grade squamous intraepithelial lesion on cytologic smear of cervix (ASC-H)  -     Liquid-based pap smear, screening    Elevated blood-pressure reading without diagnosis of hypertension       OTC Women's daily Multi vitamin  Healthy diet, exercise, weight loss,  and lifestyle discussed  gardasil discussed   Risk assessment for inherited gyn cancer done-negative   Patient was counseled today on A.C.S. Pap guidelines and recommendations for yearly pelvic exams, mammograms @ age 40  and monthly self breast exams; to see her PCP for  other health maintenance.   Ambulatory b/p monitoring     Follow up in about 1 year (around 9/11/2024).

## 2023-09-15 LAB — Lab: NORMAL

## 2023-09-18 ENCOUNTER — TELEPHONE (OUTPATIENT)
Dept: OBSTETRICS AND GYNECOLOGY | Facility: CLINIC | Age: 38
End: 2023-09-18
Payer: MEDICAID

## 2023-09-18 ENCOUNTER — TELEPHONE (OUTPATIENT)
Dept: PRIMARY CARE CLINIC | Facility: CLINIC | Age: 38
End: 2023-09-18
Payer: MEDICAID

## 2023-09-18 DIAGNOSIS — R87.619 ATYPICAL GLANDULAR CELLS ON CERVICAL PAP SMEAR: Primary | ICD-10-CM

## 2023-09-18 DIAGNOSIS — Z01.818 PREOPERATIVE CLEARANCE: Primary | ICD-10-CM

## 2023-09-18 NOTE — TELEPHONE ENCOUNTER
-    Pt  was informed about  a one day procedure done at hospital. Melita        ---- Message from Keeley Abdi NP sent at 9/18/2023  9:07 AM CDT -----  Please schedule colpo with one of the doctors.

## 2023-09-18 NOTE — TELEPHONE ENCOUNTER
Please let patient know I have ordered preop tests to be done prior to her surgery with Dr. Briceno. I have ordered blood work, an EKG and a chest XRAY to be done and let her know she can go to [facility] to have all of the tests done and she can be cleared. Please fax orders to centralized scheduling. Thanks a bunch.

## 2023-09-19 ENCOUNTER — OFFICE VISIT (OUTPATIENT)
Dept: OBSTETRICS AND GYNECOLOGY | Facility: CLINIC | Age: 38
End: 2023-09-19
Payer: MEDICAID

## 2023-09-19 VITALS
SYSTOLIC BLOOD PRESSURE: 153 MMHG | HEART RATE: 89 BPM | WEIGHT: 293 LBS | BODY MASS INDEX: 58.77 KG/M2 | DIASTOLIC BLOOD PRESSURE: 87 MMHG

## 2023-09-19 DIAGNOSIS — R87.619 ATYPICAL GLANDULAR CELLS OF UNDETERMINED SIGNIFICANCE (AGUS) ON CERVICAL PAP SMEAR: Primary | ICD-10-CM

## 2023-09-19 PROCEDURE — 3079F DIAST BP 80-89 MM HG: CPT | Mod: CPTII,S$GLB,, | Performed by: OBSTETRICS & GYNECOLOGY

## 2023-09-19 PROCEDURE — 1159F PR MEDICATION LIST DOCUMENTED IN MEDICAL RECORD: ICD-10-PCS | Mod: CPTII,S$GLB,, | Performed by: OBSTETRICS & GYNECOLOGY

## 2023-09-19 PROCEDURE — 3008F PR BODY MASS INDEX (BMI) DOCUMENTED: ICD-10-PCS | Mod: CPTII,S$GLB,, | Performed by: OBSTETRICS & GYNECOLOGY

## 2023-09-19 PROCEDURE — 3079F PR MOST RECENT DIASTOLIC BLOOD PRESSURE 80-89 MM HG: ICD-10-PCS | Mod: CPTII,S$GLB,, | Performed by: OBSTETRICS & GYNECOLOGY

## 2023-09-19 PROCEDURE — 3044F PR MOST RECENT HEMOGLOBIN A1C LEVEL <7.0%: ICD-10-PCS | Mod: CPTII,S$GLB,, | Performed by: OBSTETRICS & GYNECOLOGY

## 2023-09-19 PROCEDURE — 99214 OFFICE O/P EST MOD 30 MIN: CPT | Mod: S$GLB,,, | Performed by: OBSTETRICS & GYNECOLOGY

## 2023-09-19 PROCEDURE — 3044F HG A1C LEVEL LT 7.0%: CPT | Mod: CPTII,S$GLB,, | Performed by: OBSTETRICS & GYNECOLOGY

## 2023-09-19 PROCEDURE — 1159F MED LIST DOCD IN RCRD: CPT | Mod: CPTII,S$GLB,, | Performed by: OBSTETRICS & GYNECOLOGY

## 2023-09-19 PROCEDURE — 3077F PR MOST RECENT SYSTOLIC BLOOD PRESSURE >= 140 MM HG: ICD-10-PCS | Mod: CPTII,S$GLB,, | Performed by: OBSTETRICS & GYNECOLOGY

## 2023-09-19 PROCEDURE — 99214 PR OFFICE/OUTPT VISIT, EST, LEVL IV, 30-39 MIN: ICD-10-PCS | Mod: S$GLB,,, | Performed by: OBSTETRICS & GYNECOLOGY

## 2023-09-19 PROCEDURE — 3008F BODY MASS INDEX DOCD: CPT | Mod: CPTII,S$GLB,, | Performed by: OBSTETRICS & GYNECOLOGY

## 2023-09-19 PROCEDURE — 3077F SYST BP >= 140 MM HG: CPT | Mod: CPTII,S$GLB,, | Performed by: OBSTETRICS & GYNECOLOGY

## 2023-09-19 NOTE — PROGRESS NOTES
38 y.o.  presents for consult for JIE pap with neg HPV.  Patient's last menstrual period was 2023 (exact date)..    Past Medical History:   Diagnosis Date    Anxiety disorder, unspecified     Deep vein thrombosis     Depression     Factor 5 Leiden mutation, heterozygous     H/O blood clots     Herpes simplex virus (HSV) infection     HPV exposure     Hyperlipidemia     Thyroid disease      Past Surgical History:   Procedure Laterality Date    ADENOIDECTOMY       SECTION      DILATION AND CURETTAGE OF UTERUS      TONSILLECTOMY       Family History   Problem Relation Age of Onset    Diabetes Mother     Diabetes Maternal Uncle      Review of patient's allergies indicates:  No Known Allergies    Current Outpatient Medications:     aspirin (ECOTRIN) 81 MG EC tablet, Take 1 tablet (81 mg total) by mouth once daily., Disp: 150 tablet, Rfl: 2    ferrous sulfate (FEOSOL) 325 mg (65 mg iron) Tab tablet, Take 1 tablet (325 mg total) by mouth daily with breakfast., Disp: 90 tablet, Rfl: 0    FLUoxetine 10 MG capsule, Take 1 capsule (10 mg total) by mouth once daily., Disp: 90 capsule, Rfl: 3    levothyroxine (SYNTHROID) 25 MCG tablet, Take 1 tablet (25 mcg total) by mouth before breakfast., Disp: 30 tablet, Rfl: 11  Social History     Socioeconomic History    Marital status:    Tobacco Use    Smoking status: Never    Smokeless tobacco: Never   Substance and Sexual Activity    Alcohol use: Not Currently    Drug use: Never    Sexual activity: Yes     Partners: Male     Birth control/protection: None       Review of Systems   Constitutional:  Negative for chills and fever.   Respiratory:  Negative for shortness of breath.    Cardiovascular:  Negative for chest pain.   Gastrointestinal:  Negative for abdominal pain, blood in stool, constipation, diarrhea, nausea, vomiting and reflux.   Genitourinary:  Negative for dysmenorrhea, dyspareunia, dysuria, hematuria, hot flashes, menorrhagia, menstrual  problem, pelvic pain, vaginal bleeding, vaginal discharge, postcoital bleeding and vaginal dryness.   Musculoskeletal:  Negative for arthralgias and joint swelling.   Integumentary:  Negative for rash, hair changes, breast mass, nipple discharge and breast skin changes.   Psychiatric/Behavioral:  Negative for depression. The patient is not nervous/anxious.    Breast: Negative for asymmetry, lump, mass, nipple discharge and skin changes      Vitals:    09/19/23 0850   BP: (!) 153/87   Pulse: 89       Physical Exam:   Constitutional: She appears well-developed and well-nourished. No distress.    HENT:   Head: Normocephalic.    Eyes: Conjunctivae and EOM are normal.    Neck: No tracheal deviation present. No thyromegaly present.    Cardiovascular:       Exam reveals no clubbing, no cyanosis and no edema.        Pulmonary/Chest: Effort normal. No respiratory distress.                  Musculoskeletal: Normal range of motion and moves all extremeties.        Skin: No rash noted. She is not diaphoretic. No cyanosis. Nails show no clubbing.    Psychiatric: She has a normal mood and affect. Her behavior is normal. Judgment and thought content normal.           Assessment: JIE    Plan: d/c, cvx bx  I have discussed the risks, benefits, indications, and alternatives of the procedure in detail.  The patient verbalizes her understanding.  All questions answered.  Consents signed.  The patient agrees to proceed to proceed as planned.

## 2023-09-20 ENCOUNTER — PATIENT MESSAGE (OUTPATIENT)
Dept: PRIMARY CARE CLINIC | Facility: CLINIC | Age: 38
End: 2023-09-20
Payer: MEDICAID

## 2023-09-21 ENCOUNTER — TELEPHONE (OUTPATIENT)
Dept: PRIMARY CARE CLINIC | Facility: CLINIC | Age: 38
End: 2023-09-21
Payer: MEDICAID

## 2023-09-21 DIAGNOSIS — D50.0 IRON DEFICIENCY ANEMIA DUE TO CHRONIC BLOOD LOSS: ICD-10-CM

## 2023-09-21 RX ORDER — FERROUS SULFATE 325(65) MG
325 TABLET ORAL
Qty: 90 TABLET | Refills: 0 | Status: SHIPPED | OUTPATIENT
Start: 2023-09-21 | End: 2024-01-02

## 2023-09-21 NOTE — TELEPHONE ENCOUNTER
Please let patient know I have reviewed all of her pre op tests and the only thing I see is she is still a little anemic so I have refilled her iron tablets to her pharmacy to take once daily. Her EKG and chest XRAY are normal and all of the rest of her labs are normal. Also, her preop clearance letter has been done, please fax it to the number provided on the sheet from Dr. Briceno's office.

## 2023-09-21 NOTE — LETTER
September 21, 2023      [facility]  [address] [place] [address], [address]  [facility] [address]  Phone: [phone]  Fax: [phone]       Patient: Viktoria Westfall   YOB: 1985    The above patient is cleared for surgery. She is a low risk for a moderate risk surgery. I have reviewed all of her pre op tests and she is stable to undergo surgery with Dr. Briceno. If you have any questions or concerns, or if I can be of further assistance, please do not hesitate to contact me.    Sincerely,        Farheen Polo, NP

## 2023-09-22 NOTE — TELEPHONE ENCOUNTER
Left detailed voicemail and let patient know that the only findings was that she was anemic, provider refilled iron tablets, pre op clearance papers completed and faxed over to 's office

## 2023-09-25 ENCOUNTER — OFFICE VISIT (OUTPATIENT)
Dept: PRIMARY CARE CLINIC | Facility: CLINIC | Age: 38
End: 2023-09-25
Payer: MEDICAID

## 2023-09-25 VITALS
OXYGEN SATURATION: 99 % | SYSTOLIC BLOOD PRESSURE: 132 MMHG | DIASTOLIC BLOOD PRESSURE: 80 MMHG | BODY MASS INDEX: 43.4 KG/M2 | RESPIRATION RATE: 18 BRPM | HEART RATE: 83 BPM | WEIGHT: 293 LBS | HEIGHT: 69 IN

## 2023-09-25 DIAGNOSIS — E66.01 CLASS 3 SEVERE OBESITY DUE TO EXCESS CALORIES WITH SERIOUS COMORBIDITY AND BODY MASS INDEX (BMI) OF 60.0 TO 69.9 IN ADULT: Primary | Chronic | ICD-10-CM

## 2023-09-25 DIAGNOSIS — E03.8 OTHER SPECIFIED HYPOTHYROIDISM: ICD-10-CM

## 2023-09-25 DIAGNOSIS — I89.0 LYMPHEDEMA OF BOTH LOWER EXTREMITIES: ICD-10-CM

## 2023-09-25 DIAGNOSIS — F32.89 OTHER DEPRESSION: ICD-10-CM

## 2023-09-25 DIAGNOSIS — D50.0 IRON DEFICIENCY ANEMIA DUE TO CHRONIC BLOOD LOSS: ICD-10-CM

## 2023-09-25 PROCEDURE — 3044F PR MOST RECENT HEMOGLOBIN A1C LEVEL <7.0%: ICD-10-PCS | Mod: CPTII,S$GLB,, | Performed by: NURSE PRACTITIONER

## 2023-09-25 PROCEDURE — 3008F PR BODY MASS INDEX (BMI) DOCUMENTED: ICD-10-PCS | Mod: CPTII,S$GLB,, | Performed by: NURSE PRACTITIONER

## 2023-09-25 PROCEDURE — 3075F SYST BP GE 130 - 139MM HG: CPT | Mod: CPTII,S$GLB,, | Performed by: NURSE PRACTITIONER

## 2023-09-25 PROCEDURE — 99214 PR OFFICE/OUTPT VISIT, EST, LEVL IV, 30-39 MIN: ICD-10-PCS | Mod: S$GLB,,, | Performed by: NURSE PRACTITIONER

## 2023-09-25 PROCEDURE — 99214 OFFICE O/P EST MOD 30 MIN: CPT | Mod: S$GLB,,, | Performed by: NURSE PRACTITIONER

## 2023-09-25 PROCEDURE — 3079F PR MOST RECENT DIASTOLIC BLOOD PRESSURE 80-89 MM HG: ICD-10-PCS | Mod: CPTII,S$GLB,, | Performed by: NURSE PRACTITIONER

## 2023-09-25 PROCEDURE — 3079F DIAST BP 80-89 MM HG: CPT | Mod: CPTII,S$GLB,, | Performed by: NURSE PRACTITIONER

## 2023-09-25 PROCEDURE — 1159F MED LIST DOCD IN RCRD: CPT | Mod: CPTII,S$GLB,, | Performed by: NURSE PRACTITIONER

## 2023-09-25 PROCEDURE — 3075F PR MOST RECENT SYSTOLIC BLOOD PRESS GE 130-139MM HG: ICD-10-PCS | Mod: CPTII,S$GLB,, | Performed by: NURSE PRACTITIONER

## 2023-09-25 PROCEDURE — 1160F PR REVIEW ALL MEDS BY PRESCRIBER/CLIN PHARMACIST DOCUMENTED: ICD-10-PCS | Mod: CPTII,S$GLB,, | Performed by: NURSE PRACTITIONER

## 2023-09-25 PROCEDURE — 3008F BODY MASS INDEX DOCD: CPT | Mod: CPTII,S$GLB,, | Performed by: NURSE PRACTITIONER

## 2023-09-25 PROCEDURE — 1159F PR MEDICATION LIST DOCUMENTED IN MEDICAL RECORD: ICD-10-PCS | Mod: CPTII,S$GLB,, | Performed by: NURSE PRACTITIONER

## 2023-09-25 PROCEDURE — 3044F HG A1C LEVEL LT 7.0%: CPT | Mod: CPTII,S$GLB,, | Performed by: NURSE PRACTITIONER

## 2023-09-25 PROCEDURE — 1160F RVW MEDS BY RX/DR IN RCRD: CPT | Mod: CPTII,S$GLB,, | Performed by: NURSE PRACTITIONER

## 2023-09-25 NOTE — PATIENT INSTRUCTIONS
"RTC in 3 months for F/U or sooner if needed.    Keep appts with specialists as scheduled.    Patient Education       Anemia Caused by Low Iron   The Basics   Written by the doctors and editors at [facility]   What is anemia? -- Anemia is the term for when a person does not have enough of something called "hemoglobin" in their blood. Hemoglobin helps red blood cells carry oxygen to all parts of the body. If your hemoglobin level is low, your body might not get all the oxygen it needs.  Anemia can happen for a few reasons. A common reason is a lack of iron. This is called "iron deficiency anemia." You can have too little iron because:  You have lost a large amount of blood - This can happen slowly over time, or all of a sudden. It is the most common cause of iron deficiency anemia.  Your body cannot absorb enough iron from food - This can happen if you have had surgery on your stomach or intestines. It can also happen if you have a condition like celiac disease that affects the cells of your digestive tract.  You do not get enough iron in your food - This can be a problem in infants who drink milk without iron, or in parts of the world where people do not get enough iron in their diet.  What are the symptoms of iron deficiency anemia? -- Many people with iron deficiency anemia have no symptoms. People who do have symptoms might:  Feel irritable  Feel tired or weak, especially if they try to exercise or walk up stairs  Have headaches  Have chest pain or trouble breathing  Have cravings that make them want to eat ice or substances like hardy or wallpaper  Have "restless legs syndrome," where the legs feel like they need to keep moving, especially at night  Is there a test for anemia? -- Yes, your doctor or nurse can test your blood for anemia. The things they most often check are the "hemoglobin" and "hematocrit." These show up on a test called the "complete blood count" or "CBC."  How is iron deficiency anemia treated? -- " "The first step in treatment is to find out whether your anemia is caused by blood loss. If so, your doctor or nurse will want to find out why you are bleeding. Blood loss can be related to stomach ulcers, bowel problems, or other issues. Heavy menstrual periods and pregnancy can also cause blood loss.  In some people, there may be a condition that prevents the body from absorbing iron. Your doctor may check for this as well.  If your anemia is severe, you might need a blood transfusion. You might also need treatment for the cause of your bleeding.  The main treatment for iron deficiency is extra iron. Eating foods that contain iron is not enough. You can get extra iron in pills or through a thin tube that goes into a vein, called an "IV." Your doctor or nurse will talk with you about which is best for you.  Iron pills can cause side effects such as upset stomach and constipation (too few bowel movements). Some people cannot get enough iron from pills. This might be the case if you have had weight loss surgery or a condition called inflammatory bowel disease, or if you are pregnant and nearing the end of your pregnancy. If you have side effects, or cannot get enough iron from pills, ask your doctor or nurse what to do. They can suggest ways to reduce these side effects, or switch you to IV iron.  All topics are updated as new evidence becomes available and our peer review process is complete.  This topic retrieved from [employer] on: Sep 21, 2021.  Topic 69986 Version 11.0  Release: 29.4.2 - C29.263  © 2021 UpToDate, Inc. and/or its affiliates. All rights reserved.  Consumer Information Use and Disclaimer   This information is not specific medical advice and does not replace information you receive from your health care provider. This is only a brief summary of general information. It does NOT include all information about conditions, illnesses, injuries, tests, procedures, treatments, therapies, discharge instructions " or life-style choices that may apply to you. You must talk with your health care provider for complete information about your health and treatment options. This information should not be used to decide whether or not to accept your health care provider's advice, instructions or recommendations. Only your health care provider has the knowledge and training to provide advice that is right for you. The use of this information is governed by the [employer] End User License Agreement, available at https://www.[employer]/en/solutions/[employer]/about/mady.The use of [employer] content is governed by the [employer] Terms of Use. ©2021 UpToDate, Inc. All rights reserved.  Copyright   © 2021 UpToDate, Inc. and/or its affiliates. All rights reserved.      Patient Education       Depression Discharge Instructions, Adult   About this topic   Depression is different than normal sadness. Depression can make it hard for you to work, sleep, and enjoy life. Signs of depression include:  No longer enjoying or caring about doing the things you used to.  Feeling sad, down, hopeless, or cranky most of the day, almost every day.  Losing or gaining weight without trying to do so.  Sleeping too much or too little.  Feeling tired or like you have no energy.  Feeling guilty or like you are worth nothing.  Forgetting things or feeling confused.  Moving and speaking more slowly than usual.  Acting restless or having trouble staying still.  Depression is caused by chemicals in your brain being out of balance. Treatment for depression may take a little while to start working.     What care is needed at home?   Ask your doctor what you need to do when you go home. Make sure you ask questions if you do not understand what the doctor says.  Go to counseling or support groups as suggested by your doctor.  Avoid beer, wine, and mixed drinks. Also avoid marijuana and illegal drugs.  Speak with trusted family and friends about your depression and how  they can help.  Exercise each day. Try to spend time outside each day. Sunshine may make you feel better.  Have a regular sleep pattern and try to get 6 to 8 hours of sleep each night.  Learn how to cope with stress. Guided imagery, yoga, kate chi, or deep breathing may help reduce your signs.  What follow-up care is needed?   Depression needs to be watched closely. Your doctor may ask you to make visits to the office to check on your progress. Be sure to keep these visits.  What drugs may be needed?   The doctor may order drugs to:  Treat low mood  Improve sleep  Relieve distress and tension  Will physical activity be limited?   Physical activity like sports and exercise may help with recovery. Talk to your doctor about what activity will be good. Ask your doctor if you need help to manage any tiredness the drugs may cause.  What changes to diet are needed?   Eating a healthy diet is important during this time. This means:  Eat whole grain foods and foods high in fiber.  Choose many different fruits and vegetables. Fresh or frozen is best.  Eat less solid fats like butter or margarine. Eat less fatty or processed foods.  Eat more low-fat or lean meats like chicken, fish, or turkey. Eat less red meat.  Avoid caffeine. Try not to drink soda, coffee, tea, or energy drinks.  What can be done to prevent this health problem?   Exercise each day.  Try to spend time outside each day. Sunshine can make you feel better.  Have a regular sleep pattern where you get 6 to 8 hours of sleep each night.  Learn how to cope with stress. Guided imagery, yoga, kate chi, or deep breathing may help relieve your signs.  Learn about depression and its signs. Then, you can get help early.  Join a support group. Learn how others are living well with depression.  When do I need to call the doctor?   You have thoughts of hurting yourself or someone else.  Your depression does not get better within 1 or 2 weeks.  Your depression is getting  worse.  Your family and friends say they are worried about you.  You continue to have problems eating or sleeping.  You are functioning poorly at work, at home, or in school.  Teach Back: Helping You Understand   The Teach Back Method helps you understand the information we are giving you. After you talk with the staff, tell them in your own words what you learned. This helps to make sure the staff has described each thing clearly. It also helps to explain things that may have been confusing. Before going home, make sure you can do these:  I can tell you about my condition.  I can tell you what may help me relax and ease my stress.  I can tell you what I will do if I think about hurting myself or someone else or if my depression is not getting better.  Where can I learn more?   American Psychiatric Association  https://www.psychiatry.org/patients-families/depression/what-is-depression   Centers for Disease Control and Prevention  https://www.cdc.gov/learnmorefeelbetter/programs/depression.htm   National Association of Mental Health  https://www.que.org/learn-more/mental-health-conditions/depression   UpToDate  https://www.uptodate.com/contents/depression-in-adults-beyond-the-basics   Last Reviewed Date   2020-06-10  Consumer Information Use and Disclaimer   This information is not specific medical advice and does not replace information you receive from your health care provider. This is only a brief summary of general information. It does NOT include all information about conditions, illnesses, injuries, tests, procedures, treatments, therapies, discharge instructions or life-style choices that may apply to you. You must talk with your health care provider for complete information about your health and treatment options. This information should not be used to decide whether or not to accept your health care providers advice, instructions or recommendations. Only your health care provider has the knowledge and  training to provide advice that is right for you.  Copyright   Copyright © 2021 [employer] and its affiliates and/or licensors. All rights reserved.

## 2023-09-25 NOTE — PROGRESS NOTES
Subjective:       Patient ID: Viktoria Westfall is a 38 y.o. female.    Chief Complaint: Follow-up    HPI: Viktoria is a 38 y.o. female who presents for F/U.    Patient feels well today with no new issues or concerns.     She will be having surgery on this Wednesday by Dr. Briceno due to her recent JIE pap with neg HPV for him to look closer at the abnormal cells. Pre op clearance was done and only abnormality was slightly low iron so she has been taking iron tablets daily.    Obesity - Ms. Westfall has battled obesity most of her life. Her BMI is currently 59.44. She has been eating healthier and was started on Metformin 500 mg BID but stopped due to the medication making her sick. Referred to weight loss provider in [place] for weight loss surgery consultation.    Hypothyroidism - controlled better now since started on levothyroxine 25 mcg daily.     Also with some Depression which is now taking fluoxetine 10 mg daily and it is helping her.    History of lymphedema to bilateral lower legs so she was referred to therapy and she was taught exercises to do to help. She could not afford to do the therapy anna to costs so she has been doing the exercises instructed by the Therapist on her own.Denies CP or SOB. She says she was seen by a vein specialist and due to her damage from her DVT'S her blood pumps irregularly. Left leg is more swollen than the right. It has worsened since last November. She was treated for cellulitis to her right leg last year.     History of DVT so she takes an Aspirin daily.    Followed by Keeley Abdi NP for GYN. She is not on any birth control due to her having Factor 5 Leiden and developing a blood clot previously while on birth control. Takes Aspirin daily. Reports irregular menstrual cycles. PAP UTD, Colposcopy done as well due to previous her having abnormal PAPs in the past with a lesion which has been benign. Previously had spot found on cervix 2 years ago. Discussing possibility of  partial hysterectomy in the near future but would like her to lose some weight first and take the Aspirin for some time prior to having surgery.     Refuses COVID and FLU vaccines.              Past Medical History:   Diagnosis Date    Anxiety disorder, unspecified     Deep vein thrombosis     Depression     Factor 5 Leiden mutation, heterozygous     H/O blood clots     Herpes simplex virus (HSV) infection     HPV exposure     Hyperlipidemia     Thyroid disease        Past Surgical History:   Procedure Laterality Date    ADENOIDECTOMY       SECTION      DILATION AND CURETTAGE OF UTERUS      TONSILLECTOMY         Family History   Problem Relation Age of Onset    Diabetes Mother     Diabetes Maternal Uncle        Social History     Tobacco Use    Smoking status: Never    Smokeless tobacco: Never   Substance Use Topics    Alcohol use: Not Currently    Drug use: Never       Patient Active Problem List   Diagnosis    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   HENT:  Negative for tinnitus and trouble swallowing.    Eyes:  Negative for visual disturbance.   Respiratory:  Negative for cough, chest tightness, shortness of breath and wheezing.    Cardiovascular:  Positive for leg swelling. Negative for chest pain and palpitations.   Gastrointestinal:  Negative for abdominal distention and abdominal pain.   Endocrine: Negative for cold intolerance, heat intolerance, polydipsia, polyphagia and polyuria.   Genitourinary:  Positive for  "menstrual problem. Negative for decreased urine volume, dysuria, frequency, hematuria and urgency.   Musculoskeletal:  Negative for gait problem, neck pain and neck stiffness.   Skin:  Negative for color change and rash.   Neurological:  Negative for dizziness, syncope, weakness, light-headedness, numbness and headaches.   Psychiatric/Behavioral:  Negative for behavioral problems, confusion and dysphoric mood. The patient is not nervous/anxious.      Objective:     Vitals:    09/25/23 1022   BP: 132/80   BP Location: Left forearm   Patient Position: Sitting   BP Method: Medium (Automatic)   Pulse: 83   Resp: 18   SpO2: 99%   Weight: (!) 183.3 kg (404 lb)   Height: 5' 9" (1.753 m)       Physical Exam  Vitals and nursing note reviewed.   Constitutional:       General: She is not in acute distress.     Appearance: Normal appearance. She is not diaphoretic.   HENT:      Head: Normocephalic and atraumatic.      Nose: Nose normal.      Mouth/Throat:      Mouth: Mucous membranes are moist.      Pharynx: Oropharynx is clear.   Eyes:      Conjunctiva/sclera: Conjunctivae normal.   Cardiovascular:      Rate and Rhythm: Normal rate and regular rhythm.      Comments: Lymphedema to bilateral lower legs  Pulmonary:      Effort: Pulmonary effort is normal.      Breath sounds: Normal breath sounds. No rales.   Abdominal:      General: There is no distension.      Tenderness: There is no abdominal tenderness.   Musculoskeletal:         General: No tenderness. Normal range of motion.      Cervical back: Normal range of motion.      Right lower leg: 3+ Edema present.      Left lower leg: 3+ Edema present.   Lymphadenopathy:      Cervical: No cervical adenopathy.   Skin:     General: Skin is warm and dry.      Capillary Refill: Capillary refill takes less than 2 seconds.   Neurological:      Mental Status: She is alert and oriented to person, place, and time.   Psychiatric:         Mood and Affect: Mood and affect normal.         " Behavior: Behavior normal. Behavior is cooperative.         Thought Content: Thought content normal.         Judgment: Judgment normal.         Office Visit on 09/11/2023   Component Date Value Ref Range Status    Disclaimer 09/11/2023    Final                    Value:DISCLAIMER: Annual Pap smears are the best means now available for the early detection of cervical cancer.  There is no guarantee, however, that a particular Pap smear will  diseased cells that are present.  Thus, false negative reports may   occasionally occur.     Orders Only on 07/20/2023   Component Date Value Ref Range Status    Color, UA 07/20/2023 LtYellow  Yellow Final    Appearance, UA 07/20/2023 Clear  Clear Final    pH, UA 07/20/2023 5.5  5.0 - 8.0 pH Final    Specific Gravity, UA 07/20/2023 1.014  1.005 - 1.030 Final    Protein, UA 07/20/2023 Negative  Negative mg/dL Final    Glucose, UA 07/20/2023 Negative  Negative mg/dL Final    Ketones, UA 07/20/2023 Negative  Negative mg/dL Final    Occult Blood UA 07/20/2023 Moderate (A)  Negative Final    Nitrite, UA 07/20/2023 Negative  Negative Final    Bilirubin (UA) 07/20/2023 Negative  Negative Final    Urobilinogen, UA 07/20/2023 Normal  Normal mg/dL Final    Leukocytes, UA 07/20/2023 75 (A)  Negative Martha/uL Final    RBC, UA 07/20/2023 20-30 (A)  0 - 2 /HPF Final    WBC, UA 07/20/2023 2-4 (A)  0 - 2 /HPF Final    Squam Epithel, UA 07/20/2023 Rare  0 - 1+ /LPF Final    Crystals, Urine 07/20/2023 +/- Rare (A)  None Seen /LPF Final    Bacteria 07/20/2023 +/- Rare  0-+/- /HPF Final    MUCUS URINE 07/20/2023 +/-  0-1+ /LPF Final    Service Comment 03 07/20/2023 No, Criteria Not Met   Final   Office Visit on 07/19/2023   Component Date Value Ref Range Status    WBC 07/20/2023 8.4  4.5 - 10.0 10*3/uL Final    RBC 07/20/2023 4.48  4.20 - 5.40 10*6/uL Final    Hemoglobin 07/20/2023 11.3 (L)  12.0 - 16.0 g/dL Final    Hematocrit 07/20/2023 36.8 (L)  37.0 - 47.0 % Final    MCV 07/20/2023 82.1   80.0 - 99.0 fL Final    MCH 07/20/2023 25.2 (L)  27.0 - 32.0 pg Final    MCHC 07/20/2023 30.7 (L)  32.0 - 36.0 % Final    RDW RBC Auto-Rto 07/20/2023 15.5  0.0 - 15.5 % Final    Platelets 07/20/2023 222  130 - 400 10*3/uL Final    MPV 07/20/2023 12.1  9.2 - 12.2 fL Final    Neutrophils 07/20/2023 69.3  50 - 80 % Final    Immature Granulocytes 07/20/2023 0.40  0.0 - 0.43 % Final    Lymphocytes 07/20/2023 24.3  20.0 - 45.0 % Final    Monocytes 07/20/2023 4.0  2 - 10 % Final    Eosinophils 07/20/2023 1.5  0 - 6 % Final    Basophils 07/20/2023 0.5  0 - 3 % Final    nRBC# 07/20/2023 0.0  0 - 0.2 % Final    Neutrophils Absolute 07/20/2023 5.8  1.4 - 7.0 10*3/uL Final    Immature Granulocytes Absolute 07/20/2023 0.0300  0.0 - 0.0310 thou/uL Final    Lymphocytes Absolute 07/20/2023 2.1  1.2 - 4.0 10*3/uL Final    Monocytes Absolute 07/20/2023 0.3  0.1 - 0.8 10*3/uL Final    Eosinophils Absolute 07/20/2023 0.1  0.0 - 0.6 10*3/uL Final    Basophils Absolute 07/20/2023 0.0  0.0 - 0.3 10*3/uL Final    nRBC Count Absolute 07/20/2023 0.000  0 - 0.012 thou/uL Final    Sodium 07/20/2023 135  131 - 143 mmol/L Final    Potassium 07/20/2023 4.5  3.5 - 5.1 mmol/L Final    Chloride 07/20/2023 104  98 - 107 mmol/L Final    CO2 07/20/2023 25  21 - 32 mmol/L Final    Anion Gap 07/20/2023 6.0  3.0 - 11.0 mmol/L Final    BUN 07/20/2023 12.0  7.0 - 18.0 mg/dL Final    Creatinine 07/20/2023 0.81  0.55 - 1.02 mg/dL Final    GFR ESTIMATION 07/20/2023 > 60  >60 Final    BUN/Creatinine Ratio 07/20/2023 14.81  Ratio Final    Glucose 07/20/2023 87  74 - 106 mg/dL Final    Calcium 07/20/2023 8.7  8.5 - 10.1 mg/dL Final    Total Bilirubin 07/20/2023 0.4  0.2 - 1.0 mg/dL Final    AST 07/20/2023 17  15 - 37 U/L Final    ALT 07/20/2023 19  13 - 56 U/L Final    Total Protein 07/20/2023 7.3  6.4 - 8.2 g/dL Final    Albumin 07/20/2023 3.3 (L)  3.4 - 5.0 g/dL Final    Alkaline Phosphatase 07/20/2023 76  45 - 117 U/L Final    Estimated Avg Glucose 07/20/2023  126  mg/dL Final    Hemoglobin A1C 07/20/2023 5.7  4.2 - 6.3 % Final    Cholesterol 07/20/2023 175  <200 mg/dL Final    Triglycerides 07/20/2023 101  0 - 149 mg/dL Final    HDL 07/20/2023 54  >39 mg/dL Final    LDL Cholesterol 07/20/2023 100.8  mg/dL Final    VLDL 07/20/2023 20  mg/dL Final    Insulin 07/20/2023 20  uIU/mL Final    Free T4 07/20/2023 1.05  0.76 - 1.46 ng/dL Final    TSH 07/20/2023 3.420  0.358 - 3.74 uIU/mL Final         Assessment:      1. Class 3 severe obesity due to excess calories with serious comorbidity and body mass index (BMI) of 60.0 to 69.9 in adult    2. Iron deficiency anemia due to chronic blood loss    3. Other specified hypothyroidism    4. Other depression Well controlled   5. Lymphedema of both lower extremities          Plan:     Class 3 severe obesity due to excess calories with serious comorbidity and body mass index (BMI) of 60.0 to 69.9 in adult  Comments:  Eat a low carb, low sugar diet and exercise regularly    Iron deficiency anemia due to chronic blood loss  Comments:  Continue iron tablets daily    Other specified hypothyroidism  Comments:  continue levothyroxine daily, stable    Other depression  Comments:  continue fluoxetine daily    Lymphedema of both lower extremities  Comments:  continue exercises taught by Therapist         Current Outpatient Medications   Medication Sig Dispense Refill    aspirin (ECOTRIN) 81 MG EC tablet Take 1 tablet (81 mg total) by mouth once daily. 150 tablet 2    ferrous sulfate (FEOSOL) 325 mg (65 mg iron) Tab tablet Take 1 tablet (325 mg total) by mouth daily with breakfast. 90 tablet 0    FLUoxetine 10 MG capsule Take 1 capsule (10 mg total) by mouth once daily. 90 capsule 3    levothyroxine (SYNTHROID) 25 MCG tablet Take 1 tablet (25 mcg total) by mouth before breakfast. 30 tablet 11     No current facility-administered medications for this visit.       There are no discontinued medications.    Health Maintenance   Topic Date Due     "TETANUS VACCINE  10/21/2026    Hepatitis C Screening  Completed    Lipid Panel  Completed       Patient Instructions   RTC in 3 months for F/U or sooner if needed.    Keep appts with specialists as scheduled.    Patient Education       Anemia Caused by Low Iron   The Basics   Written by the doctors and editors at [facility]   What is anemia? -- Anemia is the term for when a person does not have enough of something called "hemoglobin" in their blood. Hemoglobin helps red blood cells carry oxygen to all parts of the body. If your hemoglobin level is low, your body might not get all the oxygen it needs.  Anemia can happen for a few reasons. A common reason is a lack of iron. This is called "iron deficiency anemia." You can have too little iron because:  You have lost a large amount of blood - This can happen slowly over time, or all of a sudden. It is the most common cause of iron deficiency anemia.  Your body cannot absorb enough iron from food - This can happen if you have had surgery on your stomach or intestines. It can also happen if you have a condition like celiac disease that affects the cells of your digestive tract.  You do not get enough iron in your food - This can be a problem in infants who drink milk without iron, or in parts of the world where people do not get enough iron in their diet.  What are the symptoms of iron deficiency anemia? -- Many people with iron deficiency anemia have no symptoms. People who do have symptoms might:  Feel irritable  Feel tired or weak, especially if they try to exercise or walk up stairs  Have headaches  Have chest pain or trouble breathing  Have cravings that make them want to eat ice or substances like hardy or wallpaper  Have "restless legs syndrome," where the legs feel like they need to keep moving, especially at night  Is there a test for anemia? -- Yes, your doctor or nurse can test your blood for anemia. The things they most often check are the "hemoglobin" and " ""hematocrit." These show up on a test called the "complete blood count" or "CBC."  How is iron deficiency anemia treated? -- The first step in treatment is to find out whether your anemia is caused by blood loss. If so, your doctor or nurse will want to find out why you are bleeding. Blood loss can be related to stomach ulcers, bowel problems, or other issues. Heavy menstrual periods and pregnancy can also cause blood loss.  In some people, there may be a condition that prevents the body from absorbing iron. Your doctor may check for this as well.  If your anemia is severe, you might need a blood transfusion. You might also need treatment for the cause of your bleeding.  The main treatment for iron deficiency is extra iron. Eating foods that contain iron is not enough. You can get extra iron in pills or through a thin tube that goes into a vein, called an "IV." Your doctor or nurse will talk with you about which is best for you.  Iron pills can cause side effects such as upset stomach and constipation (too few bowel movements). Some people cannot get enough iron from pills. This might be the case if you have had weight loss surgery or a condition called inflammatory bowel disease, or if you are pregnant and nearing the end of your pregnancy. If you have side effects, or cannot get enough iron from pills, ask your doctor or nurse what to do. They can suggest ways to reduce these side effects, or switch you to IV iron.  All topics are updated as new evidence becomes available and our peer review process is complete.  This topic retrieved from [employer] on: Sep 21, 2021.  Topic 25598 Version 11.0  Release: 29.4.2 - C29.263  © 2021 UpToDate, Inc. and/or its affiliates. All rights reserved.  Consumer Information Use and Disclaimer   This information is not specific medical advice and does not replace information you receive from your health care provider. This is only a brief summary of general information. It does NOT " include all information about conditions, illnesses, injuries, tests, procedures, treatments, therapies, discharge instructions or life-style choices that may apply to you. You must talk with your health care provider for complete information about your health and treatment options. This information should not be used to decide whether or not to accept your health care provider's advice, instructions or recommendations. Only your health care provider has the knowledge and training to provide advice that is right for you. The use of this information is governed by the [employer] End User License Agreement, available at https://www.FanTrail/en/solutions/[employer] Integration Software/about/mady.The use of [employer] content is governed by the [employer] Terms of Use. ©2021 UpToDate, Inc. All rights reserved.  Copyright   © 2021 UpToDate, Inc. and/or its affiliates. All rights reserved.      Patient Education       Depression Discharge Instructions, Adult   About this topic   Depression is different than normal sadness. Depression can make it hard for you to work, sleep, and enjoy life. Signs of depression include:  No longer enjoying or caring about doing the things you used to.  Feeling sad, down, hopeless, or cranky most of the day, almost every day.  Losing or gaining weight without trying to do so.  Sleeping too much or too little.  Feeling tired or like you have no energy.  Feeling guilty or like you are worth nothing.  Forgetting things or feeling confused.  Moving and speaking more slowly than usual.  Acting restless or having trouble staying still.  Depression is caused by chemicals in your brain being out of balance. Treatment for depression may take a little while to start working.     What care is needed at home?   Ask your doctor what you need to do when you go home. Make sure you ask questions if you do not understand what the doctor says.  Go to counseling or support groups as suggested by your doctor.  Avoid beer, wine,  and mixed drinks. Also avoid marijuana and illegal drugs.  Speak with trusted family and friends about your depression and how they can help.  Exercise each day. Try to spend time outside each day. Sunshine may make you feel better.  Have a regular sleep pattern and try to get 6 to 8 hours of sleep each night.  Learn how to cope with stress. Guided imagery, yoga, kate chi, or deep breathing may help reduce your signs.  What follow-up care is needed?   Depression needs to be watched closely. Your doctor may ask you to make visits to the office to check on your progress. Be sure to keep these visits.  What drugs may be needed?   The doctor may order drugs to:  Treat low mood  Improve sleep  Relieve distress and tension  Will physical activity be limited?   Physical activity like sports and exercise may help with recovery. Talk to your doctor about what activity will be good. Ask your doctor if you need help to manage any tiredness the drugs may cause.  What changes to diet are needed?   Eating a healthy diet is important during this time. This means:  Eat whole grain foods and foods high in fiber.  Choose many different fruits and vegetables. Fresh or frozen is best.  Eat less solid fats like butter or margarine. Eat less fatty or processed foods.  Eat more low-fat or lean meats like chicken, fish, or turkey. Eat less red meat.  Avoid caffeine. Try not to drink soda, coffee, tea, or energy drinks.  What can be done to prevent this health problem?   Exercise each day.  Try to spend time outside each day. Sunshine can make you feel better.  Have a regular sleep pattern where you get 6 to 8 hours of sleep each night.  Learn how to cope with stress. Guided imagery, yoga, kate chi, or deep breathing may help relieve your signs.  Learn about depression and its signs. Then, you can get help early.  Join a support group. Learn how others are living well with depression.  When do I need to call the doctor?   You have thoughts of  hurting yourself or someone else.  Your depression does not get better within 1 or 2 weeks.  Your depression is getting worse.  Your family and friends say they are worried about you.  You continue to have problems eating or sleeping.  You are functioning poorly at work, at home, or in school.  Teach Back: Helping You Understand   The Teach Back Method helps you understand the information we are giving you. After you talk with the staff, tell them in your own words what you learned. This helps to make sure the staff has described each thing clearly. It also helps to explain things that may have been confusing. Before going home, make sure you can do these:  I can tell you about my condition.  I can tell you what may help me relax and ease my stress.  I can tell you what I will do if I think about hurting myself or someone else or if my depression is not getting better.  Where can I learn more?   American Psychiatric Association  https://www.psychiatry.org/patients-families/depression/what-is-depression   Centers for Disease Control and Prevention  https://www.cdc.gov/learnmorefeelbetter/programs/depression.htm   National Association of Mental Health  https://www.que.org/learn-more/mental-health-conditions/depression   UpToDate  https://www.uptodate.com/contents/depression-in-adults-beyond-the-basics   Last Reviewed Date   2020-06-10  Consumer Information Use and Disclaimer   This information is not specific medical advice and does not replace information you receive from your health care provider. This is only a brief summary of general information. It does NOT include all information about conditions, illnesses, injuries, tests, procedures, treatments, therapies, discharge instructions or life-style choices that may apply to you. You must talk with your health care provider for complete information about your health and treatment options. This information should not be used to decide whether or not to accept your  health care providers advice, instructions or recommendations. Only your health care provider has the knowledge and training to provide advice that is right for you.  Copyright   Copyright © 2021 UpToDate, Inc. and its affiliates and/or licensors. All rights reserved.      Risks, benefits, and alternatives discussed with patient, Patient verbalized understanding of discussed plan of care. Asked patient if any further questions, answered no.    Future Appointments   Date Time Provider Department Center   10/3/2023  8:45 AM Keeley Abdi NP [id number] DIA[facility] [place]   9/17/2024  9:00 AM Keeley Abdi NP [id number] DIANA Polo NP

## 2023-09-27 ENCOUNTER — OUTSIDE PLACE OF SERVICE (OUTPATIENT)
Dept: OBSTETRICS AND GYNECOLOGY | Facility: CLINIC | Age: 38
End: 2023-09-27
Payer: MEDICAID

## 2023-09-27 PROCEDURE — 57520 CONIZATION OF CERVIX: CPT | Mod: ,,, | Performed by: OBSTETRICS & GYNECOLOGY

## 2023-09-27 PROCEDURE — 57520 PR CONIZATION CERVIX,KNIFE/LASER: ICD-10-PCS | Mod: ,,, | Performed by: OBSTETRICS & GYNECOLOGY

## 2023-10-03 ENCOUNTER — OFFICE VISIT (OUTPATIENT)
Dept: OBSTETRICS AND GYNECOLOGY | Facility: CLINIC | Age: 38
End: 2023-10-03
Payer: MEDICAID

## 2023-10-03 VITALS
HEIGHT: 69 IN | DIASTOLIC BLOOD PRESSURE: 85 MMHG | WEIGHT: 293 LBS | HEART RATE: 86 BPM | BODY MASS INDEX: 43.4 KG/M2 | SYSTOLIC BLOOD PRESSURE: 136 MMHG

## 2023-10-03 DIAGNOSIS — Z09 FOLLOW-UP EXAMINATION, FOLLOWING OTHER SURGERY: Primary | ICD-10-CM

## 2023-10-03 PROCEDURE — 3075F SYST BP GE 130 - 139MM HG: CPT | Mod: CPTII,S$GLB,, | Performed by: NURSE PRACTITIONER

## 2023-10-03 PROCEDURE — 3044F PR MOST RECENT HEMOGLOBIN A1C LEVEL <7.0%: ICD-10-PCS | Mod: CPTII,S$GLB,, | Performed by: NURSE PRACTITIONER

## 2023-10-03 PROCEDURE — 3079F PR MOST RECENT DIASTOLIC BLOOD PRESSURE 80-89 MM HG: ICD-10-PCS | Mod: CPTII,S$GLB,, | Performed by: NURSE PRACTITIONER

## 2023-10-03 PROCEDURE — 99024 POSTOP FOLLOW-UP VISIT: CPT | Mod: S$GLB,,, | Performed by: NURSE PRACTITIONER

## 2023-10-03 PROCEDURE — 1159F PR MEDICATION LIST DOCUMENTED IN MEDICAL RECORD: ICD-10-PCS | Mod: CPTII,S$GLB,, | Performed by: NURSE PRACTITIONER

## 2023-10-03 PROCEDURE — 1159F MED LIST DOCD IN RCRD: CPT | Mod: CPTII,S$GLB,, | Performed by: NURSE PRACTITIONER

## 2023-10-03 PROCEDURE — 3075F PR MOST RECENT SYSTOLIC BLOOD PRESS GE 130-139MM HG: ICD-10-PCS | Mod: CPTII,S$GLB,, | Performed by: NURSE PRACTITIONER

## 2023-10-03 PROCEDURE — 3044F HG A1C LEVEL LT 7.0%: CPT | Mod: CPTII,S$GLB,, | Performed by: NURSE PRACTITIONER

## 2023-10-03 PROCEDURE — 3079F DIAST BP 80-89 MM HG: CPT | Mod: CPTII,S$GLB,, | Performed by: NURSE PRACTITIONER

## 2023-10-03 PROCEDURE — 99024 PR POST-OP FOLLOW-UP VISIT: ICD-10-PCS | Mod: S$GLB,,, | Performed by: NURSE PRACTITIONER

## 2023-10-03 NOTE — PROGRESS NOTES
Subjective:       Patient ID: Viktoria Westfall is a 38 y.o. female.    Chief Complaint:  Post-op Evaluation        History of Present Illness  Presents for postop exam sp D&C and CKC  Denies any complaints.  No issues with urination, bowel movements, or heavy vaginal bleeding     Review of Systems  Review of Systems   Constitutional:  Negative for activity change, appetite change, chills, diaphoresis and fever.   Eyes:  Negative for visual disturbance.   Respiratory:  Negative for shortness of breath and wheezing.    Cardiovascular:  Negative for chest pain.   Gastrointestinal:  Negative for blood in stool, constipation, diarrhea, nausea and vomiting.   Genitourinary:  Negative for dysuria, hematuria and menorrhagia.   Integumentary:  Negative for breast skin changes and breast tenderness.   Neurological:  Negative for headaches.   Psychiatric/Behavioral:  Negative for depression. The patient is not nervous/anxious.    Breast: Negative for lump, skin changes and tenderness         Objective:   Physical Exam:   Constitutional: She appears well-developed and well-nourished. No distress.    HENT:   Head: Normocephalic and atraumatic.      Cardiovascular:       Exam reveals no clubbing and no cyanosis.        Pulmonary/Chest: Effort normal. No respiratory distress.        Abdominal: Soft. She exhibits no distension. There is no abdominal tenderness. There is no rebound and no guarding.             Musculoskeletal: Normal range of motion and moves all extremeties.        Skin: Skin is warm and dry. She is not diaphoretic. No cyanosis or erythema. Nails show no clubbing.    Psychiatric: She has a normal mood and affect. Her behavior is normal.       Pathology:     Endometrial curettings: Proliferative phase endometrium      Negative for hyperplasia, atypia, or malignancy    Cervix resection:  Benign Endocervix with acute and chronic   inflammation and reactive changes   Assessment:     1. Follow-up examination,  following other surgery         Plan:     Follow-up examination, following other surgery         Routine post op care   Repeat pap @ [facility]  Follow up for [facility].

## 2023-12-31 DIAGNOSIS — D50.0 IRON DEFICIENCY ANEMIA DUE TO CHRONIC BLOOD LOSS: ICD-10-CM

## 2024-01-02 ENCOUNTER — OFFICE VISIT (OUTPATIENT)
Dept: PRIMARY CARE CLINIC | Facility: CLINIC | Age: 39
End: 2024-01-02
Payer: MEDICAID

## 2024-01-02 VITALS
SYSTOLIC BLOOD PRESSURE: 136 MMHG | WEIGHT: 293 LBS | RESPIRATION RATE: 16 BRPM | BODY MASS INDEX: 43.4 KG/M2 | DIASTOLIC BLOOD PRESSURE: 82 MMHG | HEIGHT: 69 IN

## 2024-01-02 DIAGNOSIS — E66.01 CLASS 3 SEVERE OBESITY DUE TO EXCESS CALORIES WITHOUT SERIOUS COMORBIDITY WITH BODY MASS INDEX (BMI) OF 60.0 TO 69.9 IN ADULT: ICD-10-CM

## 2024-01-02 DIAGNOSIS — E03.8 OTHER SPECIFIED HYPOTHYROIDISM: ICD-10-CM

## 2024-01-02 DIAGNOSIS — F32.89 OTHER DEPRESSION: Primary | ICD-10-CM

## 2024-01-02 DIAGNOSIS — D50.0 IRON DEFICIENCY ANEMIA DUE TO CHRONIC BLOOD LOSS: ICD-10-CM

## 2024-01-02 DIAGNOSIS — R05.1 ACUTE COUGH: ICD-10-CM

## 2024-01-02 DIAGNOSIS — J01.10 ACUTE NON-RECURRENT FRONTAL SINUSITIS: ICD-10-CM

## 2024-01-02 PROCEDURE — 1159F MED LIST DOCD IN RCRD: CPT | Mod: CPTII,S$GLB,, | Performed by: NURSE PRACTITIONER

## 2024-01-02 PROCEDURE — 3075F SYST BP GE 130 - 139MM HG: CPT | Mod: CPTII,S$GLB,, | Performed by: NURSE PRACTITIONER

## 2024-01-02 PROCEDURE — 1160F RVW MEDS BY RX/DR IN RCRD: CPT | Mod: CPTII,S$GLB,, | Performed by: NURSE PRACTITIONER

## 2024-01-02 PROCEDURE — 3008F BODY MASS INDEX DOCD: CPT | Mod: CPTII,S$GLB,, | Performed by: NURSE PRACTITIONER

## 2024-01-02 PROCEDURE — 3079F DIAST BP 80-89 MM HG: CPT | Mod: CPTII,S$GLB,, | Performed by: NURSE PRACTITIONER

## 2024-01-02 PROCEDURE — 99214 OFFICE O/P EST MOD 30 MIN: CPT | Mod: S$GLB,,, | Performed by: NURSE PRACTITIONER

## 2024-01-02 RX ORDER — FERROUS SULFATE 325(65) MG
TABLET ORAL
Qty: 90 TABLET | Refills: 1 | Status: SHIPPED | OUTPATIENT
Start: 2024-01-02

## 2024-01-02 RX ORDER — AMOXICILLIN 500 MG/1
500 TABLET, FILM COATED ORAL EVERY 12 HOURS
Qty: 14 TABLET | Refills: 0 | Status: SHIPPED | OUTPATIENT
Start: 2024-01-02 | End: 2024-01-09

## 2024-01-02 RX ORDER — PROMETHAZINE HYDROCHLORIDE AND DEXTROMETHORPHAN HYDROBROMIDE 6.25; 15 MG/5ML; MG/5ML
5 SYRUP ORAL EVERY 6 HOURS PRN
Qty: 120 ML | Refills: 0 | Status: SHIPPED | OUTPATIENT
Start: 2024-01-02

## 2024-01-02 NOTE — PROGRESS NOTES
Subjective:       Patient ID: Viktoria Westfall is a 38 y.o. female.    Chief Complaint: Follow-up    HPI: Viktoria is a 38 y.o. female who presents for F/U.    C/O nasal and chest congestion along with a congested cough with green colored sputum for the past 3 days. She denies fever today but says she did have a fever on yesterday.    Mrs. Westfall underwent a D&C in September due to her having abnormal cells on her PAP smear but it turned out to be benign by Dr. Briceno. Followed by Keeley Abdi NP for GYN. She is not on any birth control due to her having Factor 5 Leiden and developing a blood clot previously while on birth control. Takes Aspirin daily. Reports irregular menstrual cycles.     Obesity - Ms. Westfall has battled obesity most of her life. Her BMI is currently 59.44. She has been eating healthier and was started on Metformin 500 mg BID but stopped due to the medication making her sick. Referred to weight loss provider in [place] for weight loss surgery consultation.    Hypothyroidism - controlled better now since started on levothyroxine 25 mcg daily.     Also with some Depression which is now taking fluoxetine 10 mg daily and it is helping her.    History of lymphedema to bilateral lower legs so she was referred to therapy and she was taught exercises to do to help. She could not afford to do the therapy anna to costs so she has been doing the exercises instructed by the Therapist on her own.Denies CP or SOB. She says she was seen by a vein specialist and due to her damage from her DVT'S her blood pumps irregularly. Left leg is more swollen than the right. It has worsened since last November. She was treated for cellulitis to her right leg last year.     History of DVT so she takes an Aspirin daily.    Refuses COVID and FLU vaccines.              Past Medical History:   Diagnosis Date    Anxiety disorder, unspecified     Deep vein thrombosis     Depression     Factor 5 Leiden mutation, heterozygous     H/O  blood clots     Herpes simplex virus (HSV) infection     HPV exposure     Hyperlipidemia     Thyroid disease        Past Surgical History:   Procedure Laterality Date    ADENOIDECTOMY       SECTION      DILATION AND CURETTAGE OF UTERUS      TONSILLECTOMY         Family History   Problem Relation Age of Onset    Diabetes Mother     Diabetes Maternal Uncle        Social History     Tobacco Use    Smoking status: Never    Smokeless tobacco: Never   Substance Use Topics    Alcohol use: Not Currently    Drug use: Never       Patient Active Problem List   Diagnosis    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   HENT:  Positive for congestion, postnasal drip, rhinorrhea, sinus pressure, sinus pain, sneezing and sore throat. Negative for ear pain, tinnitus and trouble swallowing.    Eyes:  Negative for visual disturbance.   Respiratory:  Positive for cough. Negative for chest tightness, shortness of breath and wheezing.    Cardiovascular:  Negative for chest pain, palpitations and leg swelling.   Gastrointestinal:  Negative for abdominal distention, abdominal pain, blood in stool, constipation, diarrhea, nausea and vomiting.   Endocrine: Negative for polydipsia, polyphagia and polyuria.   Genitourinary:  Negative for decreased urine volume, dysuria, frequency, hematuria and urgency.   Musculoskeletal:  Negative for gait problem and neck pain.   Skin:  Negative for color change and rash.   Neurological:  Negative for dizziness,  "syncope, weakness, light-headedness, numbness and headaches.   Psychiatric/Behavioral:  Negative for behavioral problems, confusion and dysphoric mood. The patient is not nervous/anxious.      Objective:     Vitals:    01/02/24 1040   BP: 136/82   BP Location: (P) Right arm   Patient Position: (P) Sitting   BP Method: (P) Medium (Automatic)   Pulse: (P) 84   Resp: 16   Weight: (!) 173.3 kg (382 lb)   Height: 5' 9" (1.753 m)       Physical Exam  Vitals and nursing note reviewed.   Constitutional:       General: She is not in acute distress.     Appearance: Normal appearance. She is not diaphoretic.   HENT:      Head: Normocephalic and atraumatic.      Right Ear: Drainage present. No swelling or tenderness.      Left Ear: Drainage and tenderness present. No swelling.      Nose: Congestion and rhinorrhea present.      Right Sinus: Frontal sinus tenderness present. No maxillary sinus tenderness.      Left Sinus: Frontal sinus tenderness present. No maxillary sinus tenderness.      Mouth/Throat:      Pharynx: Posterior oropharyngeal erythema present. No pharyngeal swelling or oropharyngeal exudate.      Tonsils: No tonsillar exudate.   Eyes:      General:         Right eye: No discharge.         Left eye: No discharge.      Extraocular Movements: Extraocular movements intact.      Conjunctiva/sclera: Conjunctivae normal.      Pupils: Pupils are equal, round, and reactive to light.   Cardiovascular:      Rate and Rhythm: Normal rate and regular rhythm.      Heart sounds: Normal heart sounds.   Pulmonary:      Effort: Pulmonary effort is normal.      Breath sounds: Normal breath sounds. No stridor. No wheezing or rales.   Abdominal:      General: Bowel sounds are normal. There is no distension.      Palpations: Abdomen is soft.      Tenderness: There is no abdominal tenderness.   Musculoskeletal:         General: No tenderness. Normal range of motion.      Cervical back: Normal range of motion.      Right lower leg: No " edema.      Left lower leg: No edema.   Lymphadenopathy:      Cervical: No cervical adenopathy.   Skin:     General: Skin is warm and dry.      Capillary Refill: Capillary refill takes less than 2 seconds.      Findings: No erythema or rash.   Neurological:      Mental Status: She is alert and oriented to person, place, and time.   Psychiatric:         Mood and Affect: Mood and affect normal.         Behavior: Behavior normal. Behavior is cooperative.         Office Visit on 09/11/2023   Component Date Value Ref Range Status    Disclaimer 09/11/2023    Final                    Value:DISCLAIMER: Annual Pap smears are the best means now available for the early detection of cervical cancer.  There is no guarantee, however, that a particular Pap smear will  diseased cells that are present.  Thus, false negative reports may   occasionally occur.     Orders Only on 07/20/2023   Component Date Value Ref Range Status    Color, UA 07/20/2023 LtYellow  Yellow Final    Appearance, UA 07/20/2023 Clear  Clear Final    pH, UA 07/20/2023 5.5  5.0 - 8.0 pH Final    Specific Gravity, UA 07/20/2023 1.014  1.005 - 1.030 Final    Protein, UA 07/20/2023 Negative  Negative mg/dL Final    Glucose, UA 07/20/2023 Negative  Negative mg/dL Final    Ketones, UA 07/20/2023 Negative  Negative mg/dL Final    Occult Blood UA 07/20/2023 Moderate (A)  Negative Final    Nitrite, UA 07/20/2023 Negative  Negative Final    Bilirubin (UA) 07/20/2023 Negative  Negative Final    Urobilinogen, UA 07/20/2023 Normal  Normal mg/dL Final    Leukocytes, UA 07/20/2023 75 (A)  Negative Martha/uL Final    RBC, UA 07/20/2023 20-30 (A)  0 - 2 /HPF Final    WBC, UA 07/20/2023 2-4 (A)  0 - 2 /HPF Final    Squam Epithel, UA 07/20/2023 Rare  0 - 1+ /LPF Final    Crystals, Urine 07/20/2023 +/- Rare (A)  None Seen /LPF Final    Bacteria 07/20/2023 +/- Rare  0-+/- /HPF Final    MUCUS URINE 07/20/2023 +/-  0-1+ /LPF Final    Service Comment 03 07/20/2023 No, Criteria Not  Met   Final   Office Visit on 07/19/2023   Component Date Value Ref Range Status    WBC 07/20/2023 8.4  4.5 - 10.0 10*3/uL Final    RBC 07/20/2023 4.48  4.20 - 5.40 10*6/uL Final    Hemoglobin 07/20/2023 11.3 (L)  12.0 - 16.0 g/dL Final    Hematocrit 07/20/2023 36.8 (L)  37.0 - 47.0 % Final    MCV 07/20/2023 82.1  80.0 - 99.0 fL Final    MCH 07/20/2023 25.2 (L)  27.0 - 32.0 pg Final    MCHC 07/20/2023 30.7 (L)  32.0 - 36.0 % Final    RDW RBC Auto-Rto 07/20/2023 15.5  0.0 - 15.5 % Final    Platelets 07/20/2023 222  130 - 400 10*3/uL Final    MPV 07/20/2023 12.1  9.2 - 12.2 fL Final    Neutrophils 07/20/2023 69.3  50 - 80 % Final    Immature Granulocytes 07/20/2023 0.40  0.0 - 0.43 % Final    Lymphocytes 07/20/2023 24.3  20.0 - 45.0 % Final    Monocytes 07/20/2023 4.0  2 - 10 % Final    Eosinophils 07/20/2023 1.5  0 - 6 % Final    Basophils 07/20/2023 0.5  0 - 3 % Final    nRBC# 07/20/2023 0.0  0 - 0.2 % Final    Neutrophils Absolute 07/20/2023 5.8  1.4 - 7.0 10*3/uL Final    Immature Granulocytes Absolute 07/20/2023 0.0300  0.0 - 0.0310 thou/uL Final    Lymphocytes Absolute 07/20/2023 2.1  1.2 - 4.0 10*3/uL Final    Monocytes Absolute 07/20/2023 0.3  0.1 - 0.8 10*3/uL Final    Eosinophils Absolute 07/20/2023 0.1  0.0 - 0.6 10*3/uL Final    Basophils Absolute 07/20/2023 0.0  0.0 - 0.3 10*3/uL Final    nRBC Count Absolute 07/20/2023 0.000  0 - 0.012 thou/uL Final    Sodium 07/20/2023 135  131 - 143 mmol/L Final    Potassium 07/20/2023 4.5  3.5 - 5.1 mmol/L Final    Chloride 07/20/2023 104  98 - 107 mmol/L Final    CO2 07/20/2023 25  21 - 32 mmol/L Final    Anion Gap 07/20/2023 6.0  3.0 - 11.0 mmol/L Final    BUN 07/20/2023 12.0  7.0 - 18.0 mg/dL Final    Creatinine 07/20/2023 0.81  0.55 - 1.02 mg/dL Final    GFR ESTIMATION 07/20/2023 > 60  >60 Final    BUN/Creatinine Ratio 07/20/2023 14.81  Ratio Final    Glucose 07/20/2023 87  74 - 106 mg/dL Final    Calcium 07/20/2023 8.7  8.5 - 10.1 mg/dL Final    Total Bilirubin  07/20/2023 0.4  0.2 - 1.0 mg/dL Final    AST 07/20/2023 17  15 - 37 U/L Final    ALT 07/20/2023 19  13 - 56 U/L Final    Total Protein 07/20/2023 7.3  6.4 - 8.2 g/dL Final    Albumin 07/20/2023 3.3 (L)  3.4 - 5.0 g/dL Final    Alkaline Phosphatase 07/20/2023 76  45 - 117 U/L Final    Estimated Avg Glucose 07/20/2023 126  mg/dL Final    Hemoglobin A1C 07/20/2023 5.7  4.2 - 6.3 % Final    Cholesterol 07/20/2023 175  <200 mg/dL Final    Triglycerides 07/20/2023 101  0 - 149 mg/dL Final    HDL 07/20/2023 54  >39 mg/dL Final    LDL Cholesterol 07/20/2023 100.8  mg/dL Final    VLDL 07/20/2023 20  mg/dL Final    Insulin 07/20/2023 20  uIU/mL Final    Free T4 07/20/2023 1.05  0.76 - 1.46 ng/dL Final    TSH 07/20/2023 3.420  0.358 - 3.74 uIU/mL Final         Assessment:      1. Other depression    2. Class 3 severe obesity due to excess calories without serious comorbidity with body mass index (BMI) of 60.0 to 69.9 in adult    3. Other specified hypothyroidism    4. Iron deficiency anemia due to chronic blood loss    5. Acute non-recurrent frontal sinusitis    6. Acute cough          Plan:     Other depression    Class 3 severe obesity due to excess calories without serious comorbidity with body mass index (BMI) of 60.0 to 69.9 in adult    Other specified hypothyroidism    Iron deficiency anemia due to chronic blood loss  -     CBC Auto Differential; Future; Expected date: 01/02/2024    Acute non-recurrent frontal sinusitis  -     amoxicillin (AMOXIL) 500 MG Tab; Take 1 tablet (500 mg total) by mouth every 12 (twelve) hours. for 7 days  Dispense: 14 tablet; Refill: 0    Acute cough  -     promethazine-dextromethorphan (PROMETHAZINE-DM) 6.25-15 mg/5 mL Syrp; Take 5 mLs by mouth every 6 (six) hours as needed (cough).  Dispense: 120 mL; Refill: 0         Current Outpatient Medications   Medication Sig Dispense Refill    aspirin (ECOTRIN) 81 MG EC tablet Take 1 tablet (81 mg total) by mouth once daily. 150 tablet 2    FLUoxetine  "10 MG capsule Take 1 capsule (10 mg total) by mouth once daily. 90 capsule 3    levothyroxine (SYNTHROID) 25 MCG tablet Take 1 tablet (25 mcg total) by mouth before breakfast. 30 tablet 11    amoxicillin (AMOXIL) 500 MG Tab Take 1 tablet (500 mg total) by mouth every 12 (twelve) hours. for 7 days 14 tablet 0    ferrous sulfate (FEOSOL) 325 mg (65 mg iron) Tab tablet TAKE 1 TABLET BY MOUTH EVERY DAY WITH BREAKFAST 90 tablet 1    promethazine-dextromethorphan (PROMETHAZINE-DM) 6.25-15 mg/5 mL Syrp Take 5 mLs by mouth every 6 (six) hours as needed (cough). 120 mL 0     No current facility-administered medications for this visit.       There are no discontinued medications.    Health Maintenance   Topic Date Due    TETANUS VACCINE  10/21/2026    Hepatitis C Screening  Completed    Lipid Panel  Completed       Patient Instructions   RTC in 3 months for F/U or sooner if needed.    Keep appts with specialists as scheduled.    Patient Education       Anemia Caused by Low Iron   The Basics   Written by the doctors and editors at [facility]   What is anemia? -- Anemia is the term for when a person does not have enough of something called "hemoglobin" in their blood. Hemoglobin helps red blood cells carry oxygen to all parts of the body. If your hemoglobin level is low, your body might not get all the oxygen it needs.  Anemia can happen for a few reasons. A common reason is a lack of iron. This is called "iron deficiency anemia." You can have too little iron because:  You have lost a large amount of blood - This can happen slowly over time, or all of a sudden. It is the most common cause of iron deficiency anemia.  Your body cannot absorb enough iron from food - This can happen if you have had surgery on your stomach or intestines. It can also happen if you have a condition like celiac disease that affects the cells of your digestive tract.  You do not get enough iron in your food - This can be a problem in infants who drink " "milk without iron, or in parts of the world where people do not get enough iron in their diet.  What are the symptoms of iron deficiency anemia? -- Many people with iron deficiency anemia have no symptoms. People who do have symptoms might:  Feel irritable  Feel tired or weak, especially if they try to exercise or walk up stairs  Have headaches  Have chest pain or trouble breathing  Have cravings that make them want to eat ice or substances like hardy or wallpaper  Have "restless legs syndrome," where the legs feel like they need to keep moving, especially at night  Is there a test for anemia? -- Yes, your doctor or nurse can test your blood for anemia. The things they most often check are the "hemoglobin" and "hematocrit." These show up on a test called the "complete blood count" or "CBC."  How is iron deficiency anemia treated? -- The first step in treatment is to find out whether your anemia is caused by blood loss. If so, your doctor or nurse will want to find out why you are bleeding. Blood loss can be related to stomach ulcers, bowel problems, or other issues. Heavy menstrual periods and pregnancy can also cause blood loss.  In some people, there may be a condition that prevents the body from absorbing iron. Your doctor may check for this as well.  If your anemia is severe, you might need a blood transfusion. You might also need treatment for the cause of your bleeding.  The main treatment for iron deficiency is extra iron. Eating foods that contain iron is not enough. You can get extra iron in pills or through a thin tube that goes into a vein, called an "IV." Your doctor or nurse will talk with you about which is best for you.  Iron pills can cause side effects such as upset stomach and constipation (too few bowel movements). Some people cannot get enough iron from pills. This might be the case if you have had weight loss surgery or a condition called inflammatory bowel disease, or if you are pregnant and " nearing the end of your pregnancy. If you have side effects, or cannot get enough iron from pills, ask your doctor or nurse what to do. They can suggest ways to reduce these side effects, or switch you to IV iron.  All topics are updated as new evidence becomes available and our peer review process is complete.  This topic retrieved from [employer] on: Sep 21, 2021.  Topic 40337 Version 11.0  Release: 29.4.2 - C29.263  © 2021 UpToDate, Inc. and/or its affiliates. All rights reserved.  Consumer Information Use and Disclaimer   This information is not specific medical advice and does not replace information you receive from your health care provider. This is only a brief summary of general information. It does NOT include all information about conditions, illnesses, injuries, tests, procedures, treatments, therapies, discharge instructions or life-style choices that may apply to you. You must talk with your health care provider for complete information about your health and treatment options. This information should not be used to decide whether or not to accept your health care provider's advice, instructions or recommendations. Only your health care provider has the knowledge and training to provide advice that is right for you. The use of this information is governed by the [employer] End User License Agreement, available at https://www.[employer].WatchDox/en/solutions/ADmantX/about/mady.The use of [employer] content is governed by the [employer] Terms of Use. ©2021 UpToDate, Inc. All rights reserved.  Copyright   © 2021 UpToDate, Inc. and/or its affiliates. All rights reserved.    Patient Education       Bacterial Upper Respiratory Infection, Adult   About this topic   Germs cause this health problem. You have signs in your nose, windpipe, voice box or larynx, throat, ears, or lungs that last for days or weeks. It often spreads from a person who is sick to some other person from close contact. This health problem may  include:  Sore throat. This is pharyngitis.  Swelling of the lining of the nose. This is rhinitis.  Swelling of the sinuses with pain in the face, forehead, or upper teeth. This is sinusitis.  Swelling of the nose and throat. This is nasopharyngitis.  Swelling of the upper part of the voice box. This is epiglottitis.  Swelling of the voice box. This is laryngitis.  Cough  What are the causes?   The main cause is an infection by certain germs.  What can make this more likely to happen?   Cold or winter season  Low immune system  Close contact with someone who has an upper respiratory infection  Allergies or asthma  Working in a school or day care center  What are the main signs?   Cough or sneezing  Sore throat  Runny or stuffy nose  Ear pain  Fever  Headache  Muscle pain  Tired  Weakness  How does the doctor diagnose this health problem?   Your doctor will do an exam and ask about your history. Your doctor will check your nose, throat, and lungs. The doctor may order:  Lab tests  Throat swab  Chest x-ray  CT or MRI scan  How does the doctor treat this health problem?   Your doctor may give you drugs to treat or prevent infection. You may also be given other drugs based on your condition. Talk to your doctor about what drugs you need to take.  What lifestyle changes are needed?   You need to rest while you are getting better. If your throat is sore, don't talk too much. This will rest your voice and throat. Drink plenty of fluids to stay hydrated.  What drugs may be needed?   The doctor may order drugs to:  Help with pain and swelling  Fight an infection  Dry up a stuffy nose  Stop wheezing  Control coughing  What can be done to prevent this health problem?   Wash your hands often with soap and water for at least 20 seconds, especially after coughing or sneezing. Alcohol-based hand sanitizers also work to kill the virus.  If you are sick, cover your mouth and nose with tissue when you cough or sneeze. You can also  cough into your elbow. Throw away tissues in the trash and wash your hands after touching used tissues.  Clean commonly handled things like door handles, remotes, toys, and phones. Wipe them with a disinfectant.  Do not get too close (kissing, hugging) to people who are sick.  Do not share food, drinks, towels, or hankies with anyone who is sick.  Stay away from crowded places.  Where can I learn more?   American Academy of Family Physicians  https://familydoctor.org/antibiotic-resistance/   American Academy of Family Physicians  https://familydoctor.org/condition/colds-and-the-flu/   Centers for Disease Control and Prevention  http://www.cdc.gov/getsmart/antibiotic-use/URI/index.html   NHS Choices  http://www.nhs.uk/conditions/Respiratory-tract-infection/Pages/Introduction.aspx   Last Reviewed Date   2020-01-24  Consumer Information Use and Disclaimer   This information is not specific medical advice and does not replace information you receive from your health care provider. This is only a brief summary of general information. It does NOT include all information about conditions, illnesses, injuries, tests, procedures, treatments, therapies, discharge instructions or life-style choices that may apply to you. You must talk with your health care provider for complete information about your health and treatment options. This information should not be used to decide whether or not to accept your health care providers advice, instructions or recommendations. Only your health care provider has the knowledge and training to provide advice that is right for you.  Copyright   Copyright © 2021 UpToDate, Inc. and its affiliates and/or licensors. All rights reserved.      Risks, benefits, and alternatives discussed with patient, Patient verbalized understanding of discussed plan of care. Asked patient if any further questions, answered no.    Future Appointments   Date Time Provider Department Center   4/12/2024  8:20 AM  Farheen Polo NP [facility] [id number] [facility] [place]   9/17/2024  9:00 AM Keeley Abdi NP [facility] [id number] DIANA Polo NP

## 2024-01-02 NOTE — PATIENT INSTRUCTIONS
"RTC in 3 months for F/U or sooner if needed.    Keep appts with specialists as scheduled.    Patient Education       Anemia Caused by Low Iron   The Basics   Written by the doctors and editors at [facility]   What is anemia? -- Anemia is the term for when a person does not have enough of something called "hemoglobin" in their blood. Hemoglobin helps red blood cells carry oxygen to all parts of the body. If your hemoglobin level is low, your body might not get all the oxygen it needs.  Anemia can happen for a few reasons. A common reason is a lack of iron. This is called "iron deficiency anemia." You can have too little iron because:  You have lost a large amount of blood - This can happen slowly over time, or all of a sudden. It is the most common cause of iron deficiency anemia.  Your body cannot absorb enough iron from food - This can happen if you have had surgery on your stomach or intestines. It can also happen if you have a condition like celiac disease that affects the cells of your digestive tract.  You do not get enough iron in your food - This can be a problem in infants who drink milk without iron, or in parts of the world where people do not get enough iron in their diet.  What are the symptoms of iron deficiency anemia? -- Many people with iron deficiency anemia have no symptoms. People who do have symptoms might:  Feel irritable  Feel tired or weak, especially if they try to exercise or walk up stairs  Have headaches  Have chest pain or trouble breathing  Have cravings that make them want to eat ice or substances like hardy or wallpaper  Have "restless legs syndrome," where the legs feel like they need to keep moving, especially at night  Is there a test for anemia? -- Yes, your doctor or nurse can test your blood for anemia. The things they most often check are the "hemoglobin" and "hematocrit." These show up on a test called the "complete blood count" or "CBC."  How is iron deficiency anemia treated? -- " "The first step in treatment is to find out whether your anemia is caused by blood loss. If so, your doctor or nurse will want to find out why you are bleeding. Blood loss can be related to stomach ulcers, bowel problems, or other issues. Heavy menstrual periods and pregnancy can also cause blood loss.  In some people, there may be a condition that prevents the body from absorbing iron. Your doctor may check for this as well.  If your anemia is severe, you might need a blood transfusion. You might also need treatment for the cause of your bleeding.  The main treatment for iron deficiency is extra iron. Eating foods that contain iron is not enough. You can get extra iron in pills or through a thin tube that goes into a vein, called an "IV." Your doctor or nurse will talk with you about which is best for you.  Iron pills can cause side effects such as upset stomach and constipation (too few bowel movements). Some people cannot get enough iron from pills. This might be the case if you have had weight loss surgery or a condition called inflammatory bowel disease, or if you are pregnant and nearing the end of your pregnancy. If you have side effects, or cannot get enough iron from pills, ask your doctor or nurse what to do. They can suggest ways to reduce these side effects, or switch you to IV iron.  All topics are updated as new evidence becomes available and our peer review process is complete.  This topic retrieved from [employer] on: Sep 21, 2021.  Topic 74931 Version 11.0  Release: 29.4.2 - C29.263  © 2021 UpToDate, Inc. and/or its affiliates. All rights reserved.  Consumer Information Use and Disclaimer   This information is not specific medical advice and does not replace information you receive from your health care provider. This is only a brief summary of general information. It does NOT include all information about conditions, illnesses, injuries, tests, procedures, treatments, therapies, discharge instructions " or life-style choices that may apply to you. You must talk with your health care provider for complete information about your health and treatment options. This information should not be used to decide whether or not to accept your health care provider's advice, instructions or recommendations. Only your health care provider has the knowledge and training to provide advice that is right for you. The use of this information is governed by the [employer] End User License Agreement, available at https://www.archify/en/solutions/[employer]ark/about/mady.The use of [employer] content is governed by the [employer] Terms of Use. ©2021 UpToDate, Inc. All rights reserved.  Copyright   © 2021 UpToDate, Inc. and/or its affiliates. All rights reserved.    Patient Education       Bacterial Upper Respiratory Infection, Adult   About this topic   Germs cause this health problem. You have signs in your nose, windpipe, voice box or larynx, throat, ears, or lungs that last for days or weeks. It often spreads from a person who is sick to some other person from close contact. This health problem may include:  Sore throat. This is pharyngitis.  Swelling of the lining of the nose. This is rhinitis.  Swelling of the sinuses with pain in the face, forehead, or upper teeth. This is sinusitis.  Swelling of the nose and throat. This is nasopharyngitis.  Swelling of the upper part of the voice box. This is epiglottitis.  Swelling of the voice box. This is laryngitis.  Cough  What are the causes?   The main cause is an infection by certain germs.  What can make this more likely to happen?   Cold or winter season  Low immune system  Close contact with someone who has an upper respiratory infection  Allergies or asthma  Working in a school or day care center  What are the main signs?   Cough or sneezing  Sore throat  Runny or stuffy nose  Ear pain  Fever  Headache  Muscle pain  Tired  Weakness  How does the doctor diagnose this health problem?    Your doctor will do an exam and ask about your history. Your doctor will check your nose, throat, and lungs. The doctor may order:  Lab tests  Throat swab  Chest x-ray  CT or MRI scan  How does the doctor treat this health problem?   Your doctor may give you drugs to treat or prevent infection. You may also be given other drugs based on your condition. Talk to your doctor about what drugs you need to take.  What lifestyle changes are needed?   You need to rest while you are getting better. If your throat is sore, don't talk too much. This will rest your voice and throat. Drink plenty of fluids to stay hydrated.  What drugs may be needed?   The doctor may order drugs to:  Help with pain and swelling  Fight an infection  Dry up a stuffy nose  Stop wheezing  Control coughing  What can be done to prevent this health problem?   Wash your hands often with soap and water for at least 20 seconds, especially after coughing or sneezing. Alcohol-based hand sanitizers also work to kill the virus.  If you are sick, cover your mouth and nose with tissue when you cough or sneeze. You can also cough into your elbow. Throw away tissues in the trash and wash your hands after touching used tissues.  Clean commonly handled things like door handles, remotes, toys, and phones. Wipe them with a disinfectant.  Do not get too close (kissing, hugging) to people who are sick.  Do not share food, drinks, towels, or hankies with anyone who is sick.  Stay away from crowded places.  Where can I learn more?   American Academy of Family Physicians  https://familydoctor.org/antibiotic-resistance/   American Academy of Family Physicians  https://familydoctor.org/condition/colds-and-the-flu/   Centers for Disease Control and Prevention  http://www.cdc.gov/getsmart/antibiotic-use/URI/index.html   NHS Choices  http://www.nhs.uk/conditions/Respiratory-tract-infection/Pages/Introduction.aspx   Last Reviewed Date   2020-01-24  Consumer Information Use  and Disclaimer   This information is not specific medical advice and does not replace information you receive from your health care provider. This is only a brief summary of general information. It does NOT include all information about conditions, illnesses, injuries, tests, procedures, treatments, therapies, discharge instructions or life-style choices that may apply to you. You must talk with your health care provider for complete information about your health and treatment options. This information should not be used to decide whether or not to accept your health care providers advice, instructions or recommendations. Only your health care provider has the knowledge and training to provide advice that is right for you.  Copyright   Copyright © 2021 UpToDate, Inc. and its affiliates and/or licensors. All rights reserved.

## 2024-01-31 ENCOUNTER — TELEPHONE (OUTPATIENT)
Dept: OBSTETRICS AND GYNECOLOGY | Facility: CLINIC | Age: 39
End: 2024-01-31
Payer: COMMERCIAL

## 2024-04-12 ENCOUNTER — CLINICAL SUPPORT (OUTPATIENT)
Dept: OBSTETRICS AND GYNECOLOGY | Facility: CLINIC | Age: 39
End: 2024-04-12
Payer: COMMERCIAL

## 2024-04-12 ENCOUNTER — OFFICE VISIT (OUTPATIENT)
Dept: PRIMARY CARE CLINIC | Facility: CLINIC | Age: 39
End: 2024-04-12
Payer: COMMERCIAL

## 2024-04-12 VITALS
HEIGHT: 69 IN | SYSTOLIC BLOOD PRESSURE: 134 MMHG | TEMPERATURE: 98 F | OXYGEN SATURATION: 97 % | RESPIRATION RATE: 18 BRPM | DIASTOLIC BLOOD PRESSURE: 82 MMHG | WEIGHT: 293 LBS | HEART RATE: 86 BPM | BODY MASS INDEX: 43.4 KG/M2

## 2024-04-12 DIAGNOSIS — D50.0 IRON DEFICIENCY ANEMIA DUE TO CHRONIC BLOOD LOSS: ICD-10-CM

## 2024-04-12 DIAGNOSIS — E55.9 VITAMIN D DEFICIENCY: ICD-10-CM

## 2024-04-12 DIAGNOSIS — E03.8 OTHER SPECIFIED HYPOTHYROIDISM: ICD-10-CM

## 2024-04-12 DIAGNOSIS — R35.0 URINE FREQUENCY: ICD-10-CM

## 2024-04-12 DIAGNOSIS — F41.9 ANXIETY AND DEPRESSION: ICD-10-CM

## 2024-04-12 DIAGNOSIS — Z00.00 ANNUAL PHYSICAL EXAM: Primary | ICD-10-CM

## 2024-04-12 DIAGNOSIS — F32.A ANXIETY AND DEPRESSION: ICD-10-CM

## 2024-04-12 DIAGNOSIS — Z01.89 ROUTINE LAB DRAW: Primary | ICD-10-CM

## 2024-04-12 DIAGNOSIS — E66.01 CLASS 3 SEVERE OBESITY DUE TO EXCESS CALORIES WITHOUT SERIOUS COMORBIDITY WITH BODY MASS INDEX (BMI) OF 60.0 TO 69.9 IN ADULT: ICD-10-CM

## 2024-04-12 DIAGNOSIS — Z13.1 DIABETES MELLITUS SCREENING: ICD-10-CM

## 2024-04-12 DIAGNOSIS — Z13.220 SCREENING CHOLESTEROL LEVEL: ICD-10-CM

## 2024-04-12 LAB
25(OH)D3 SERPL-MCNC: 19 NG/ML
ABS NRBC COUNT: 0 THOU/UL (ref 0–0.01)
ABSOLUTE BASOPHIL: 0.1 10*3/UL (ref 0–0.3)
ABSOLUTE EOSINOPHIL: 0.1 10*3/UL (ref 0–0.6)
ABSOLUTE IMMATURE GRAN: 0.14 THOU/UL (ref 0–0.03)
ABSOLUTE LYMPHOCYTE: 1.7 10*3/UL (ref 1.2–4)
ABSOLUTE MONOCYTE: 0.5 10*3/UL (ref 0.1–0.8)
ALBUMIN SERPL BCP-MCNC: 3.9 G/DL (ref 3.4–5)
ALP SERPL-CCNC: 92 U/L (ref 45–117)
ALT SERPL W P-5'-P-CCNC: 23 U/L (ref 13–56)
ANION GAP SERPL CALC-SCNC: 11 MMOL/L (ref 3–11)
APPEARANCE, UA: CLEAR
AST SERPL-CCNC: 19 U/L (ref 15–37)
BACTERIA SPEC CULT: ABNORMAL /HPF
BASOPHILS NFR BLD: 0.5 % (ref 0–3)
BILIRUB SERPL-MCNC: 0.3 MG/DL (ref 0.2–1)
BILIRUB UR QL STRIP: NEGATIVE
BUN SERPL-MCNC: 21 MG/DL (ref 7–18)
BUN/CREAT SERPL: 20.19 RATIO
CALCIUM OXALATE CRYSTALS, UA: ABNORMAL /LPF
CALCIUM SERPL-MCNC: 8.9 MG/DL (ref 8.5–10.1)
CHLORIDE SERPL-SCNC: 102 MMOL/L (ref 98–107)
CHOLEST SERPL-MSCNC: 172 MG/DL
CO2 SERPL-SCNC: 24 MMOL/L (ref 21–32)
COLOR UR: YELLOW
CREAT SERPL-MCNC: 1.04 MG/DL (ref 0.55–1.02)
EOSINOPHIL NFR BLD: 1 % (ref 0–6)
ERYTHROCYTE [DISTWIDTH] IN BLOOD BY AUTOMATED COUNT: 14.6 % (ref 0–15.5)
ESTIMATED AVG GLUCOSE: 133 MG/DL
GFR ESTIMATION: 60
GLUCOSE (UA): NEGATIVE MG/DL
GLUCOSE SERPL-MCNC: 98 MG/DL (ref 74–106)
HBA1C MFR BLD: 5.9 % (ref 4.2–6.3)
HCT VFR BLD AUTO: 39.7 % (ref 37–47)
HDLC SERPL-MCNC: 59 MG/DL
HGB BLD-MCNC: 12 G/DL (ref 12–16)
HGB UR QL STRIP: NEGATIVE
IMMATURE GRANULOCYTES: 1.3 % (ref 0–0.43)
KETONES UR QL STRIP: NEGATIVE MG/DL
LDLC SERPL CALC-MCNC: 89.8 MG/DL
LEUKOCYTE ESTERASE UR QL STRIP: NEGATIVE LEU/UL
LYMPHOCYTES NFR BLD: 15.3 % (ref 20–45)
MCH RBC QN AUTO: 25.5 PG (ref 27–32)
MCHC RBC AUTO-ENTMCNC: 30.2 % (ref 32–36)
MCV RBC AUTO: 84.5 FL (ref 80–99)
MONOCYTES NFR BLD: 4.8 % (ref 2–10)
MUCUS URINE: ABNORMAL /LPF
NEUTROPHILS # BLD AUTO: 8.4 10*3/UL (ref 1.4–7)
NEUTROPHILS NFR BLD: 77.1 % (ref 50–80)
NITRITE UR QL STRIP: NEGATIVE
NUCLEATED RED BLOOD CELLS: 0 % (ref 0–0.2)
PH UR STRIP: 5.5 PH (ref 5–8)
PLATELETS: 255 10*3/UL (ref 130–400)
PMV BLD AUTO: 11.8 FL (ref 9.2–12.2)
POTASSIUM SERPL-SCNC: 4.3 MMOL/L (ref 3.5–5.1)
PROT SERPL-MCNC: 8 G/DL (ref 6.4–8.2)
PROT UR QL STRIP: 20 MG/DL
RBC # BLD AUTO: 4.7 10*6/UL (ref 4.2–5.4)
RBC #/AREA URNS HPF: ABNORMAL /HPF (ref 0–2)
SERVICE COMMENT 03: ABNORMAL
SODIUM BLD-SCNC: 137 MMOL/L (ref 131–143)
SP GR UR STRIP: 1.03 (ref 1–1.03)
SQUAMOUS EPITHELIAL, UA: ABNORMAL /LPF
T4 FREE SP9 P CHAL SERPL-SCNC: 1.09 NG/DL (ref 0.76–1.46)
TRIGL SERPL-MCNC: 116 MG/DL (ref 0–149)
TSH SERPL DL<=0.005 MIU/L-ACNC: 5.8 UIU/ML (ref 0.36–3.74)
UROBILINOGEN UR STRIP-ACNC: NORMAL MG/DL
VLDL CHOLESTEROL: 23 MG/DL
WBC # BLD: 10.9 10*3/UL (ref 4.5–10)
WBC #/AREA URNS HPF: ABNORMAL /HPF (ref 0–2)

## 2024-04-12 PROCEDURE — 3079F DIAST BP 80-89 MM HG: CPT | Mod: CPTII,S$GLB,, | Performed by: NURSE PRACTITIONER

## 2024-04-12 PROCEDURE — 36415 COLL VENOUS BLD VENIPUNCTURE: CPT | Mod: S$GLB,,, | Performed by: NURSE PRACTITIONER

## 2024-04-12 PROCEDURE — 3075F SYST BP GE 130 - 139MM HG: CPT | Mod: CPTII,S$GLB,, | Performed by: NURSE PRACTITIONER

## 2024-04-12 PROCEDURE — 3044F HG A1C LEVEL LT 7.0%: CPT | Mod: CPTII,S$GLB,, | Performed by: NURSE PRACTITIONER

## 2024-04-12 PROCEDURE — 3008F BODY MASS INDEX DOCD: CPT | Mod: CPTII,S$GLB,, | Performed by: NURSE PRACTITIONER

## 2024-04-12 PROCEDURE — 99395 PREV VISIT EST AGE 18-39: CPT | Mod: S$GLB,,, | Performed by: NURSE PRACTITIONER

## 2024-04-12 RX ORDER — NAPROXEN SODIUM 220 MG/1
81 TABLET, FILM COATED ORAL DAILY
COMMUNITY

## 2024-04-12 RX ORDER — LEVOTHYROXINE SODIUM 25 UG/1
25 TABLET ORAL
COMMUNITY
End: 2024-04-12 | Stop reason: SDUPTHER

## 2024-04-12 RX ORDER — FLUOXETINE 10 MG/1
10 CAPSULE ORAL DAILY
COMMUNITY
End: 2024-04-12 | Stop reason: SDUPTHER

## 2024-04-12 NOTE — PATIENT INSTRUCTIONS
RTC in 3 months for F/U or sooner if needed.    Keep appts with specialists as scheduled.    Patient Education       Yearly Physical for Adults   About this topic   Most people do not want to be sick. Having a checkup each year with your doctor is one way to help you stay healthy. You may need to see your doctor more or less often. How often you need to go to the doctor depends on your age. Your family and medical history also play a role in how often you need to go to the doctor. Going to see your doctor on a routine basis can help you find problems early or even before they start. This may make it easier to treat or cure your problem.  General   Your doctor will talk about many things during your checkup. Your doctor may ask about:  Your medical and family history.  All the drugs you are taking. Be sure to include all prescription, over the counter, and herbal supplements. Tell the doctor if you have any drug allergy. Bring a list of drugs you take with you.  How you are feeling and if you are having any problems.  Risky behaviors like smoking, drinking alcohol, using illegal drugs, not wearing seatbelts, having unprotected sex, etc.  Your doctor will do a physical exam and may check your:  Height and weight  Blood pressure  Reflexes  Memory  Vision  Hearing  Your doctor may order:  Lab tests  ECG to check your heart rhythm  X-rays  Tests or treatments based on your exam  What lifestyle changes are needed?   Your doctor may suggest you make changes to your lifestyle at this visit. The doctor may talk with you about being more active or lowering stress levels. Ask your doctor what you need to do.  What drugs may be needed?   Your doctor may order drugs or vaccines to protect you from illnesses.  What changes to diet are needed?   Talk to your doctor to see if any changes are needed to your diet.  When do I need to call the doctor?   Call your doctor if you need to learn about any test results. Together you can make  a plan for more care.  Helpful tips   Make a list of questions for your doctor before you go. This will help you remember to ask about any concerns. Write down any answers from your doctor so you can look over them after your visit.   Tell your doctor about any changes in your body or health since your last visit.  Ask your doctor about any screening tests you need.  Where can I learn more?   American Academy of Family Physicians  http://familydoctor.org/familydoctor/en/prevention-wellness/staying-healthy/healthy-living/preventive-services-for-healthy-living.printerview.html   Centers for Disease Control  http://www.cdc.gov/family/checkup/   Last Reviewed Date   2019-04-22  Consumer Information Use and Disclaimer   This information is not specific medical advice and does not replace information you receive from your health care provider. This is only a brief summary of general information. It does NOT include all information about conditions, illnesses, injuries, tests, procedures, treatments, therapies, discharge instructions or life-style choices that may apply to you. You must talk with your health care provider for complete information about your health and treatment options. This information should not be used to decide whether or not to accept your health care providers advice, instructions or recommendations. Only your health care provider has the knowledge and training to provide advice that is right for you.  Copyright   Copyright © 2021 UpToDate, Inc. and its affiliates and/or licensors. All rights reserved.    Patient Education       Low Cholesterol, Saturated Fat, and Trans Fat Diet   About this topic   Cholesterol, saturated fat, and trans fat are in many foods. These may raise your blood cholesterol levels. If your cholesterol is too high, this can cause health problems in your heart, liver, kidneys, and even your eyes. The key to lowering your risk of heart problems is to lower your bad fat  "intake.  Saturated fats and trans fats are the bad fats. These fats clog your arteries and raise your bad cholesterol. Saturated fats and trans fats are solid fats at room temperature. Saturated fats are animal fats. Trans fats are manmade fats. They add flavor to a lot of packaged foods. Staying away from saturated and trans fats will help your heart.  When you do eat foods with fat, make sure they have the good fats. Monounsaturated and polyunsaturated fats are good fats. These fats help raise your good cholesterol and protect your heart.  General   How to Lower Fat and Cholesterol in Your Diet   Read the labels of the foods you buy from the market to find out how much fat is present. Under 5% of total fat on a label means it is "low fat". Over 20% of total fat on a label means it is high fat.  Eat high fiber foods, like soluble fiber. This type of fiber helps lower cholesterol in the body. Choose oatmeal, fruits (like apples), beans, and nuts to get the most soluble fiber.  Eat foods high in omega-3 fatty acids like lisa seeds, walnuts, salmon, tuna, trout, herring, flaxseed, and soybeans. These foods help keep the heart healthy.  Limit your bad fat and oil intake.  Stay away from butter, stick margarine, shortening, lard, and palm and coconut oil. Pick plant-based spreads instead.  Limit mayonnaise, salad dressings, gravies, and sauces, unless it is made from low-fat ingredients.  Limit chocolate.  Do not eat high-fat processed foods like hot dogs, aguilar, sausage, ham and other luncheon meats high in fat, and some frozen foods. Pick fish, chicken, turkey, and lean meats instead.  Eat more dried beans, lentils, and tofu to get your protein.  Do not eat organ meats, like liver.  Choose nonfat or low-fat milk, yogurt, and cheese.  Use light or fat-free cream cheese and sour cream.  Eat lots of fruits and vegetables.  Pick whole grain breads, cereals, pastas, and rice.  Do not eat snacks that are high in fats like " granola, cookies, pies, pastries, doughnuts, and croissants.  Stay away from deep fried foods.  Help When Cooking   Remove the fat portion of meats and the skin from poultry before cooking.  Bake, broil, grill, poach, or roast poultry, fish, and lean meats.  Drain and throw away the fat that drains out of meat as you cook it.  Try to add little or no fat to foods.  Use olive or canola oil for cooking or baking.  Steam your vegetables.  Use herbs or no-oil marinades to flavor foods.         Who should use this diet?   This diet is for people who are at high risk of getting health problems like heart disease, high blood pressure, diabetes, and others. This diet is also good for all people to follow to keep your heart healthy.  What foods are good to eat?   Foods with good fats are:  Canola, peanut, and olive oil  Safflower, soybean, and corn oil  Walnuts, almonds, cashews, and peanuts  Pumpkin and sunflower seeds  [place] and tuna  Tofu  Soymilk  Avocado  What foods should be limited or avoided?   Stay away from these types of foods that have saturated fats:  Whole fat dairy products like cheese, ice cream, whole milk, and cream  Palm and coconut oils  High-fat meats like beef, lamb, poultry with the skin, aguilar, and sausage  Butter and lard  Stay away from these types of foods that may have trans fat:  Cookies, cakes, candy, doughnuts, baked goods, muffins, pizza dough, and pie crusts that are packaged  Fried foods  Frozen dinners  Chips and crackers  Microwave popcorn  Stick margarine and vegetable shortenings  Helpful tips   To help stay away from saturated fat:  Pick lean cuts of meat  Take the skin off chicken and turkey or pick skinless  Pick low-fat cheese, milk, and ice cream  Use liquid oils when cooking and baking, such as olive oil and canola oil  To help stay away from trans fat:  Look at your labels. Choose foods with 0% trans fat. Read the ingredient list. Avoid foods with partially hydrogenated oil in  the ingredient list. This means there is trans fat in the product.  Where can I learn more?   American Heart Association   http://www.heart.org/HEARTORG/Conditions/Cholesterol/PreventionTreatmentofHighCholesterol/Know-Your-Fats_UC_305628_Article.jsp   Last Reviewed Date   2021-10-05  Consumer Information Use and Disclaimer   This information is not specific medical advice and does not replace information you receive from your health care provider. This is only a brief summary of general information. It does NOT include all information about conditions, illnesses, injuries, tests, procedures, treatments, therapies, discharge instructions or life-style choices that may apply to you. You must talk with your health care provider for complete information about your health and treatment options. This information should not be used to decide whether or not to accept your health care providers advice, instructions or recommendations. Only your health care provider has the knowledge and training to provide advice that is right for you.   Copyright   Copyright © 2021 UpToDate, Inc. and its affiliates and/or licensors. All rights reserved.

## 2024-04-12 NOTE — PROGRESS NOTES
Subjective:       Patient ID: Viktoria Westfall is a 38 y.o. female.    Chief Complaint: Annual Exam    HPI: Viktoria is a 38 y.o. female who presents for annual visit.    Mrs. Westfall has battled obesity several years even after a healthy diet and exercise so she would like a referral to a Bariatric Surgeon. Her BMI 58.94.    Mrs. Westfall underwent a D&C in September due to her having abnormal cells on her PAP smear but it turned out to be benign by Dr. Briceno. Followed by Keeley Abdi NP for GYN. She is not on any birth control due to her having Factor 5 Leiden and developing a blood clot previously while on birth control. She takes Aspirin daily. Reports irregular menstrual cycles.     Hypothyroidism - controlled better now on levothyroxine 25 mcg daily.     Also with some Depression but she has been dealing with it on her own. She was taking fluoxetine 10 mg daily but stopped taking it because she did not like the way it made her feel.    Mrs. Westfall has lymphedema to both of her lower legs so she was referred to therapy and was taught exercises to do to help. She could not afford to do the therapy due to costs so she has been doing the exercises instructed by the Therapist on her own. She says she was seen by a vein specialist and due to her damage from her DVT's her blood pumps irregularly. Left leg is more swollen than the right. She takes Aspirin daily.    Denies smoking or drinking.    Refuses COVID and FLU vaccines.              Past Medical History:   Diagnosis Date    Anxiety disorder, unspecified     Deep vein thrombosis     Depression     Factor 5 Leiden mutation, heterozygous     H/O blood clots     Herpes simplex virus (HSV) infection     HPV exposure     Hyperlipidemia     Thyroid disease        Past Surgical History:   Procedure Laterality Date    ADENOIDECTOMY       SECTION      DILATION AND CURETTAGE OF UTERUS      TONSILLECTOMY         Family History   Problem Relation Name Age of Onset     Diabetes Mother Poppy     Arthritis Mother Poppy     Stroke Mother Poppy     Diabetes Maternal Uncle Mago     Heart disease Maternal Grandfather Ric     Drug abuse Brother Louie     Early death Sister Itzel        Social History     Tobacco Use    Smoking status: Never    Smokeless tobacco: Never   Substance Use Topics    Alcohol use: Not Currently    Drug use: Never       Patient Active Problem List   Diagnosis    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 Negative for activity change, appetite change, chills, diaphoresis and fever.   HENT:  Negative for congestion, ear pain, sinus pain, tinnitus and trouble swallowing.    Eyes:  Negative for visual disturbance.   Respiratory:  Negative for cough, chest tightness, shortness of breath and wheezing.    Cardiovascular:  Positive for leg swelling. Negative for chest pain and palpitations.   Gastrointestinal:  Negative for abdominal distention, abdominal pain, blood in stool, constipation, diarrhea, nausea and vomiting.   Endocrine: Negative for cold intolerance, heat intolerance, polydipsia, polyphagia and polyuria.   Genitourinary:  Positive for menstrual problem. Negative for decreased urine volume, dysuria, frequency, hematuria and urgency.   Musculoskeletal:  Negative for back pain, gait problem and neck pain.   Skin:  Negative for color change and rash.   Neurological:  Negative for dizziness, syncope, weakness, light-headedness, numbness and headaches.   Hematological:  Negative for adenopathy. Does  "not bruise/bleed easily.   Psychiatric/Behavioral:  Negative for behavioral problems, confusion and dysphoric mood.      Objective:     Vitals:    04/12/24 0812   BP: 134/82   BP Location: Right arm   Patient Position: Sitting   BP Method: Large (Automatic)   Pulse: 86   Resp: 18   Temp: 98 °F (36.7 °C)   TempSrc: Oral   SpO2: 97%   Weight: (!) 181 kg (399 lb 1.6 oz)   Height: 5' 9" (1.753 m)       Physical Exam  Vitals and nursing note reviewed.   Constitutional:       General: She is not in acute distress.     Appearance: Normal appearance. She is not diaphoretic.   HENT:      Head: Normocephalic and atraumatic.      Nose: Nose normal.      Mouth/Throat:      Mouth: Mucous membranes are moist.      Pharynx: Oropharynx is clear.   Eyes:      General:         Right eye: No discharge.      Extraocular Movements: Extraocular movements intact.      Conjunctiva/sclera: Conjunctivae normal.      Pupils: Pupils are equal, round, and reactive to light.   Cardiovascular:      Rate and Rhythm: Normal rate and regular rhythm.      Pulses: Normal pulses.      Heart sounds: Normal heart sounds.   Pulmonary:      Effort: Pulmonary effort is normal.      Breath sounds: Normal breath sounds. No stridor. No wheezing or rales.   Abdominal:      General: Bowel sounds are normal. There is no distension.      Palpations: Abdomen is soft.      Tenderness: There is no abdominal tenderness.   Musculoskeletal:         General: Swelling (lower legs) present. No tenderness. Normal range of motion.      Cervical back: Normal range of motion and neck supple.      Right lower leg: Edema present.      Left lower leg: Edema present.   Lymphadenopathy:      Cervical: No cervical adenopathy.   Skin:     General: Skin is warm and dry.      Capillary Refill: Capillary refill takes less than 2 seconds.      Findings: No rash.   Neurological:      General: No focal deficit present.      Mental Status: She is alert and oriented to person, place, and " time.   Psychiatric:         Mood and Affect: Mood and affect normal.         Behavior: Behavior normal. Behavior is cooperative.         Orders Only on 04/12/2024   Component Date Value Ref Range Status    Vit D, 25-Hydroxy 04/12/2024 19 (L)  >30 ng/mL Final   Orders Only on 04/12/2024   Component Date Value Ref Range Status    Sodium 04/12/2024 137  131 - 143 mmol/L Final    Potassium 04/12/2024 4.3  3.5 - 5.1 mmol/L Final    Chloride 04/12/2024 102  98 - 107 mmol/L Final    CO2 04/12/2024 24  21 - 32 mmol/L Final    Anion Gap 04/12/2024 11.0  3.0 - 11.0 mmol/L Final    BUN 04/12/2024 21.0 (H)  7.0 - 18.0 mg/dL Final    Creatinine 04/12/2024 1.04 (H)  0.55 - 1.02 mg/dL Final    GFR ESTIMATION 04/12/2024 60  >60 Final    BUN/Creatinine Ratio 04/12/2024 20.19  Ratio Final    Glucose 04/12/2024 98  74 - 106 mg/dL Final    Calcium 04/12/2024 8.9  8.5 - 10.1 mg/dL Final    Total Bilirubin 04/12/2024 0.3  0.2 - 1.0 mg/dL Final    AST 04/12/2024 19  15 - 37 U/L Final    ALT 04/12/2024 23  13 - 56 U/L Final    Total Protein 04/12/2024 8.0  6.4 - 8.2 g/dL Final    Albumin 04/12/2024 3.9  3.4 - 5.0 g/dL Final    Alkaline Phosphatase 04/12/2024 92  45 - 117 U/L Final    Cholesterol 04/12/2024 172  <200 mg/dL Final    Triglycerides 04/12/2024 116  0 - 149 mg/dL Final    HDL 04/12/2024 59  >39 mg/dL Final    LDL Cholesterol 04/12/2024 89.8  mg/dL Final    VLDL 04/12/2024 23  mg/dL Final    Free T4 04/12/2024 1.09  0.76 - 1.46 ng/dL Final    TSH 04/12/2024 5.800 (H)  0.358 - 3.74 uIU/mL Final   Orders Only on 04/12/2024   Component Date Value Ref Range Status    Estimated Avg Glucose 04/12/2024 133  mg/dL Final    Hemoglobin A1C 04/12/2024 5.9  4.2 - 6.3 % Final   Orders Only on 04/12/2024   Component Date Value Ref Range Status    Color, UA 04/12/2024 Yellow  Yellow Final    Appearance, UA 04/12/2024 Clear  Clear Final    pH, UA 04/12/2024 5.5  5.0 - 8.0 pH Final    Specific Gravity, UA 04/12/2024 1.035 (H)  1.005 - 1.030  Final    Protein, UA 04/12/2024 20 (A)  Negative mg/dL Final    Glucose, UA 04/12/2024 Negative  Negative mg/dL Final    Ketones, UA 04/12/2024 Negative  Negative mg/dL Final    Occult Blood UA 04/12/2024 Negative  Negative Final    Nitrite, UA 04/12/2024 Negative  Negative Final    Bilirubin (UA) 04/12/2024 Negative  Negative Final    Urobilinogen, UA 04/12/2024 Normal  Normal mg/dL Final    Leukocytes, UA 04/12/2024 Negative  Negative Martha/uL Final    RBC, UA 04/12/2024 2-4 (A)  0 - 2 /HPF Final    WBC, UA 04/12/2024 0-2  0 - 2 /HPF Final    Squam Epithel, UA 04/12/2024 3+ Many (A)  0 - 1+ /LPF Final    Ca Oxalate Daxa, UA 04/12/2024 1+ Few (A)  None Seen /LPF Final    Bacteria 04/12/2024 +/- Rare  0-+/- /HPF Final    MUCUS URINE 04/12/2024 +/-  0-1+ /LPF Final    Service Comment 03 04/12/2024 No, Criteria Not Met   Final   Orders Only on 04/12/2024   Component Date Value Ref Range Status    WBC 04/12/2024 10.9 (H)  4.5 - 10.0 10*3/uL Final    RBC 04/12/2024 4.70  4.20 - 5.40 10*6/uL Final    Hemoglobin 04/12/2024 12.0  12.0 - 16.0 g/dL Final    Hematocrit 04/12/2024 39.7  37.0 - 47.0 % Final    MCV 04/12/2024 84.5  80.0 - 99.0 fL Final    MCH 04/12/2024 25.5 (L)  27.0 - 32.0 pg Final    MCHC 04/12/2024 30.2 (L)  32.0 - 36.0 % Final    RDW RBC Auto-Rto 04/12/2024 14.6  0.0 - 15.5 % Final    Platelets 04/12/2024 255  130 - 400 10*3/uL Final    MPV 04/12/2024 11.8  9.2 - 12.2 fL Final    Neutrophils 04/12/2024 77.1  50 - 80 % Final    Immature Granulocytes 04/12/2024 1.30 (H)  0.0 - 0.43 % Final    Lymphocytes 04/12/2024 15.3 (L)  20.0 - 45.0 % Final    Monocytes 04/12/2024 4.8  2 - 10 % Final    Eosinophils 04/12/2024 1.0  0 - 6 % Final    Basophils 04/12/2024 0.5  0 - 3 % Final    nRBC# 04/12/2024 0.0  0 - 0.2 % Final    Neutrophils Absolute 04/12/2024 8.4 (H)  1.4 - 7.0 10*3/uL Final    Immature Granulocytes Absolute 04/12/2024 0.1400 (H)  0.0 - 0.0310 thou/uL Final    Lymphocytes Absolute 04/12/2024 1.7  1.2 -  4.0 10*3/uL Final    Monocytes Absolute 04/12/2024 0.5  0.1 - 0.8 10*3/uL Final    Eosinophils Absolute 04/12/2024 0.1  0.0 - 0.6 10*3/uL Final    Basophils Absolute 04/12/2024 0.1  0.0 - 0.3 10*3/uL Final    nRBC Count Absolute 04/12/2024 0.000  0 - 0.012 thou/uL Final         Assessment:      1. Annual physical exam    2. Other specified hypothyroidism    3. Anxiety and depression    4. Iron deficiency anemia due to chronic blood loss    5. Class 3 severe obesity due to excess calories without serious comorbidity with body mass index (BMI) of 60.0 to 69.9 in adult    6. Urine frequency    7. Vitamin D deficiency    8. Diabetes mellitus screening    9. Screening cholesterol level          Plan:     Annual physical exam  Comments:  Will review labs and determine POC based on results.  Orders:  -     CBC Auto Differential; Future; Expected date: 04/12/2024  -     Comprehensive Metabolic Panel; Future; Expected date: 04/12/2024  -     Hemoglobin A1C; Future; Expected date: 04/12/2024  -     Lipid Panel; Future; Expected date: 04/12/2024  -     TSH; Future; Expected date: 04/12/2024    Other specified hypothyroidism  -     T4, Free; Future; Expected date: 04/12/2024  -     TSH; Future; Expected date: 04/12/2024    Anxiety and depression    Iron deficiency anemia due to chronic blood loss  -     CBC Auto Differential; Future; Expected date: 04/12/2024    Class 3 severe obesity due to excess calories without serious comorbidity with body mass index (BMI) of 60.0 to 69.9 in adult  -     Ambulatory referral/consult to Bariatric Surgery; Future; Expected date: 04/19/2024  -     Hemoglobin A1C; Future; Expected date: 04/12/2024    Urine frequency  -     Urinalysis, Reflex to Urine Culture Urine, Clean Catch; Future; Expected date: 04/12/2024    Vitamin D deficiency  -     Vitamin D; Future; Expected date: 04/12/2024    Diabetes mellitus screening  -     Hemoglobin A1C; Future; Expected date: 04/12/2024    Screening  cholesterol level  -     Lipid Panel; Future; Expected date: 04/12/2024         Current Outpatient Medications   Medication Sig Dispense Refill    aspirin 81 MG Chew Take 81 mg by mouth once daily.      ferrous sulfate (FEOSOL) 325 mg (65 mg iron) Tab tablet TAKE 1 TABLET BY MOUTH EVERY DAY WITH BREAKFAST 90 tablet 1    cholecalciferol, vitamin D3, (DECARA) 1,250 mcg (50,000 unit) capsule Take 1 capsule (50,000 Units total) by mouth once a week. for 12 doses 12 capsule 0    levothyroxine (SYNTHROID) 50 MCG tablet Take 1 tablet (50 mcg total) by mouth before breakfast. 30 tablet 2     No current facility-administered medications for this visit.       Medications Discontinued During This Encounter   Medication Reason    promethazine-dextromethorphan (PROMETHAZINE-DM) 6.25-15 mg/5 mL Syrp Patient no longer taking    FLUoxetine 10 MG capsule Duplicate Order    aspirin (ECOTRIN) 81 MG EC tablet Duplicate Order    levothyroxine (SYNTHROID) 25 MCG tablet Duplicate Order       Health Maintenance   Topic Date Due    TETANUS VACCINE  10/21/2026    Hepatitis C Screening  Completed    Lipid Panel  Completed       Patient Instructions   RTC in 3 months for F/U or sooner if needed.    Keep appts with specialists as scheduled.    Patient Education       Yearly Physical for Adults   About this topic   Most people do not want to be sick. Having a checkup each year with your doctor is one way to help you stay healthy. You may need to see your doctor more or less often. How often you need to go to the doctor depends on your age. Your family and medical history also play a role in how often you need to go to the doctor. Going to see your doctor on a routine basis can help you find problems early or even before they start. This may make it easier to treat or cure your problem.  General   Your doctor will talk about many things during your checkup. Your doctor may ask about:  Your medical and family history.  All the drugs you are  taking. Be sure to include all prescription, over the counter, and herbal supplements. Tell the doctor if you have any drug allergy. Bring a list of drugs you take with you.  How you are feeling and if you are having any problems.  Risky behaviors like smoking, drinking alcohol, using illegal drugs, not wearing seatbelts, having unprotected sex, etc.  Your doctor will do a physical exam and may check your:  Height and weight  Blood pressure  Reflexes  Memory  Vision  Hearing  Your doctor may order:  Lab tests  ECG to check your heart rhythm  X-rays  Tests or treatments based on your exam  What lifestyle changes are needed?   Your doctor may suggest you make changes to your lifestyle at this visit. The doctor may talk with you about being more active or lowering stress levels. Ask your doctor what you need to do.  What drugs may be needed?   Your doctor may order drugs or vaccines to protect you from illnesses.  What changes to diet are needed?   Talk to your doctor to see if any changes are needed to your diet.  When do I need to call the doctor?   Call your doctor if you need to learn about any test results. Together you can make a plan for more care.  Helpful tips   Make a list of questions for your doctor before you go. This will help you remember to ask about any concerns. Write down any answers from your doctor so you can look over them after your visit.   Tell your doctor about any changes in your body or health since your last visit.  Ask your doctor about any screening tests you need.  Where can I learn more?   American Academy of [employer]icians  http://familydoctor.org/familydoctor/en/prevention-wellness/staying-healthy/healthy-living/preventive-services-for-healthy-living.printerview.html   Centers for Disease Control  http://www.cdc.gov/family/checkup/   Last Reviewed Date   2019-04-22  Consumer Information Use and Disclaimer   This information is not specific medical advice and does not replace  "information you receive from your health care provider. This is only a brief summary of general information. It does NOT include all information about conditions, illnesses, injuries, tests, procedures, treatments, therapies, discharge instructions or life-style choices that may apply to you. You must talk with your health care provider for complete information about your health and treatment options. This information should not be used to decide whether or not to accept your health care providers advice, instructions or recommendations. Only your health care provider has the knowledge and training to provide advice that is right for you.  Copyright   Copyright © 2021 UpToDate, Inc. and its affiliates and/or licensors. All rights reserved.    Patient Education       Low Cholesterol, Saturated Fat, and Trans Fat Diet   About this topic   Cholesterol, saturated fat, and trans fat are in many foods. These may raise your blood cholesterol levels. If your cholesterol is too high, this can cause health problems in your heart, liver, kidneys, and even your eyes. The key to lowering your risk of heart problems is to lower your bad fat intake.  Saturated fats and trans fats are the bad fats. These fats clog your arteries and raise your bad cholesterol. Saturated fats and trans fats are solid fats at room temperature. Saturated fats are animal fats. Trans fats are manmade fats. They add flavor to a lot of packaged foods. Staying away from saturated and trans fats will help your heart.  When you do eat foods with fat, make sure they have the good fats. Monounsaturated and polyunsaturated fats are good fats. These fats help raise your good cholesterol and protect your heart.  General   How to Lower Fat and Cholesterol in Your Diet   Read the labels of the foods you buy from the market to find out how much fat is present. Under 5% of total fat on a label means it is "low fat". Over 20% of total fat on a label means it is high " fat.  Eat high fiber foods, like soluble fiber. This type of fiber helps lower cholesterol in the body. Choose oatmeal, fruits (like apples), beans, and nuts to get the most soluble fiber.  Eat foods high in omega-3 fatty acids like lisa seeds, walnuts, salmon, tuna, trout, herring, flaxseed, and soybeans. These foods help keep the heart healthy.  Limit your bad fat and oil intake.  Stay away from butter, stick margarine, shortening, lard, and palm and coconut oil. Pick plant-based spreads instead.  Limit mayonnaise, salad dressings, gravies, and sauces, unless it is made from low-fat ingredients.  Limit chocolate.  Do not eat high-fat processed foods like hot dogs, aguilar, sausage, ham and other luncheon meats high in fat, and some frozen foods. Pick fish, chicken, turkey, and lean meats instead.  Eat more dried beans, lentils, and tofu to get your protein.  Do not eat organ meats, like liver.  Choose nonfat or low-fat milk, yogurt, and cheese.  Use light or fat-free cream cheese and sour cream.  Eat lots of fruits and vegetables.  Pick whole grain breads, cereals, pastas, and rice.  Do not eat snacks that are high in fats like granola, cookies, pies, pastries, doughnuts, and croissants.  Stay away from deep fried foods.  Help When Cooking   Remove the fat portion of meats and the skin from poultry before cooking.  Bake, broil, grill, poach, or roast poultry, fish, and lean meats.  Drain and throw away the fat that drains out of meat as you cook it.  Try to add little or no fat to foods.  Use olive or canola oil for cooking or baking.  Steam your vegetables.  Use herbs or no-oil marinades to flavor foods.         Who should use this diet?   This diet is for people who are at high risk of getting health problems like heart disease, high blood pressure, diabetes, and others. This diet is also good for all people to follow to keep your heart healthy.  What foods are good to eat?   Foods with good fats are:  Canola,  peanut, and olive oil  Safflower, soybean, and corn oil  Walnuts, almonds, cashews, and peanuts  Pumpkin and sunflower seeds  [place] and tuna  Tofu  Soymilk  Avocado  What foods should be limited or avoided?   Stay away from these types of foods that have saturated fats:  Whole fat dairy products like cheese, ice cream, whole milk, and cream  Palm and coconut oils  High-fat meats like beef, lamb, poultry with the skin, aguilar, and sausage  Butter and lard  Stay away from these types of foods that may have trans fat:  Cookies, cakes, candy, doughnuts, baked goods, muffins, pizza dough, and pie crusts that are packaged  Fried foods  Frozen dinners  Chips and crackers  Microwave popcorn  Stick margarine and vegetable shortenings  Helpful tips   To help stay away from saturated fat:  Pick lean cuts of meat  Take the skin off chicken and turkey or pick skinless  Pick low-fat cheese, milk, and ice cream  Use liquid oils when cooking and baking, such as olive oil and canola oil  To help stay away from trans fat:  Look at your labels. Choose foods with 0% trans fat. Read the ingredient list. Avoid foods with partially hydrogenated oil in the ingredient list. This means there is trans fat in the product.  Where can I learn more?   American Heart Association   http://www.heart.org/HEARTORG/Conditions/Cholesterol/PreventionTreatmentofHighCholesterol/Know-Your-Fats_[facility]_305628_Article.jsp   Last Reviewed Date   2021-10-05  Consumer Information Use and Disclaimer   This information is not specific medical advice and does not replace information you receive from your health care provider. This is only a brief summary of general information. It does NOT include all information about conditions, illnesses, injuries, tests, procedures, treatments, therapies, discharge instructions or life-style choices that may apply to you. You must talk with your health care provider for complete information about your health and treatment options.  This information should not be used to decide whether or not to accept your health care providers advice, instructions or recommendations. Only your health care provider has the knowledge and training to provide advice that is right for you.   Copyright   Copyright © 2021 UpToDate, Inc. and its affiliates and/or licensors. All rights reserved.        Risks, benefits, and alternatives discussed with patient, Patient verbalized understanding of discussed plan of care. Asked patient if any further questions, answered no.    Future Appointments   Date Time Provider Department Center   7/24/2024  9:40 AM Farheen Polo NP [facility] [id number] [facility] [place]   9/17/2024  9:00 AM Keeley Abdi NP [facility] [id number] DIANA Polo NP

## 2024-04-15 DIAGNOSIS — N30.00 ACUTE CYSTITIS WITHOUT HEMATURIA: ICD-10-CM

## 2024-04-15 DIAGNOSIS — E03.8 OTHER SPECIFIED HYPOTHYROIDISM: Primary | ICD-10-CM

## 2024-04-15 DIAGNOSIS — E55.9 VITAMIN D DEFICIENCY: ICD-10-CM

## 2024-04-15 RX ORDER — ASPIRIN 325 MG
50000 TABLET, DELAYED RELEASE (ENTERIC COATED) ORAL WEEKLY
Qty: 12 CAPSULE | Refills: 0 | Status: SHIPPED | OUTPATIENT
Start: 2024-04-15 | End: 2024-07-02

## 2024-04-15 RX ORDER — LEVOTHYROXINE SODIUM 50 UG/1
50 TABLET ORAL
Qty: 30 TABLET | Refills: 2 | Status: SHIPPED | OUTPATIENT
Start: 2024-04-15

## 2024-04-15 RX ORDER — NITROFURANTOIN 25; 75 MG/1; MG/1
100 CAPSULE ORAL 2 TIMES DAILY
Qty: 10 CAPSULE | Refills: 0 | Status: SHIPPED | OUTPATIENT
Start: 2024-04-15 | End: 2024-04-20

## 2024-04-15 NOTE — PROGRESS NOTES
Lab results reviewed. Urine shows a UTI so will treat with macrobid. Labs show elevated TSH, will increase levothyroxine to 50 mcg daily. Vitamin D level low, will treat with high dose Vitamin D capsules weekly for 12 weeks. All other labs are within normal range.

## 2024-05-01 ENCOUNTER — PATIENT MESSAGE (OUTPATIENT)
Dept: PRIMARY CARE CLINIC | Facility: CLINIC | Age: 39
End: 2024-05-01
Payer: COMMERCIAL

## 2024-05-07 ENCOUNTER — TELEPHONE (OUTPATIENT)
Dept: SURGERY | Facility: CLINIC | Age: 39
End: 2024-05-07
Payer: COMMERCIAL

## 2024-05-07 NOTE — TELEPHONE ENCOUNTER
Called pt and completed intake form and discussed referral process; notified pt will forward to insurance verification team and will contact her with status/next steps. Prt verbalized understanding.

## 2024-05-07 NOTE — TELEPHONE ENCOUNTER
----- Message from Darcie Gutierrez LPN sent at 5/7/2024  8:01 AM CDT -----  Contact: Viktoria    ----- Message -----  From: Darcie Gutierrez LPN  Sent: 5/6/2024   3:50 PM CDT  To: Darcie Gutierrez LPN      ----- Message -----  From: Silva Fong  Sent: 5/6/2024   3:34 PM CDT  To: Javier Blum is needing a call back in regards to checking the status of her referral. Please give her a call back at [phone]

## 2024-05-29 ENCOUNTER — TELEPHONE (OUTPATIENT)
Dept: SURGERY | Facility: CLINIC | Age: 39
End: 2024-05-29
Payer: COMMERCIAL

## 2024-05-29 NOTE — TELEPHONE ENCOUNTER
Returned pt call. Confirmed that her insurance benefits does include bariatric surgery. Scheduled pt for consult w/ Dr. Herrera for 06/03/24. E-mailed pt new pt bariatric questionnaire. Referral status update and letter of recommendation request sent to pt PCP - [facility]

## 2024-05-29 NOTE — TELEPHONE ENCOUNTER
----- Message from Ayana Gonzalez sent at 5/29/2024  1:07 PM CDT -----  Contact: Viktoria Blum is calling regards to a follow up she wants to know if the office had a chance to call her insurance to see if the procedure (I'm sorry she does not know the name) will be cover under her insurance, please call her at  [phone]

## 2024-06-03 ENCOUNTER — PATIENT MESSAGE (OUTPATIENT)
Dept: SURGERY | Facility: CLINIC | Age: 39
End: 2024-06-03

## 2024-06-03 ENCOUNTER — OFFICE VISIT (OUTPATIENT)
Dept: SURGERY | Facility: CLINIC | Age: 39
End: 2024-06-03
Payer: COMMERCIAL

## 2024-06-03 VITALS — HEIGHT: 69 IN | WEIGHT: 293 LBS | BODY MASS INDEX: 43.4 KG/M2

## 2024-06-03 DIAGNOSIS — E88.810 METABOLIC SYNDROME: ICD-10-CM

## 2024-06-03 PROCEDURE — 1160F RVW MEDS BY RX/DR IN RCRD: CPT | Mod: CPTII,S$GLB,, | Performed by: SURGERY

## 2024-06-03 PROCEDURE — 1159F MED LIST DOCD IN RCRD: CPT | Mod: CPTII,S$GLB,, | Performed by: SURGERY

## 2024-06-03 PROCEDURE — 3008F BODY MASS INDEX DOCD: CPT | Mod: CPTII,S$GLB,, | Performed by: SURGERY

## 2024-06-03 PROCEDURE — 99204 OFFICE O/P NEW MOD 45 MIN: CPT | Mod: S$GLB,,, | Performed by: SURGERY

## 2024-06-03 PROCEDURE — 3044F HG A1C LEVEL LT 7.0%: CPT | Mod: CPTII,S$GLB,, | Performed by: SURGERY

## 2024-06-03 RX ORDER — FLUOXETINE 10 MG/1
CAPSULE ORAL
COMMUNITY
Start: 2024-05-29

## 2024-06-03 NOTE — PROGRESS NOTES
Subjective:       Patient ID: Viktoria Westfall is a 39 y.o. female.    Chief Complaint: No chief complaint on file.       This is a 39-year-old female here for bariatric surgery evaluation.  Patient has a BMI of 58, does have metabolic syndrome.  Patient has had multiple attempts at losing weight and has been unsuccessful.      Review of Systems   Constitutional:  Negative for chills and fever.   HENT:  Negative for nasal congestion and rhinorrhea.    Eyes:  Negative for discharge and visual disturbance.   Respiratory:  Negative for shortness of breath and wheezing.    Cardiovascular:  Negative for chest pain and palpitations.   Gastrointestinal: Negative.  Negative for abdominal distention, abdominal pain, anal bleeding, blood in stool, constipation, diarrhea, nausea, rectal pain and vomiting.   Endocrine: Negative for cold intolerance and heat intolerance.   Genitourinary:  Negative for difficulty urinating and frequency.   Musculoskeletal:  Negative for back pain and myalgias.   Integumentary:  Negative for pallor and rash.   Neurological:  Negative for dizziness and headaches.   Hematological:  Negative for adenopathy. Does not bruise/bleed easily.   Psychiatric/Behavioral:  Negative for behavioral problems. The patient is not nervous/anxious.          Objective:      Physical Exam  Constitutional:       Appearance: Normal appearance. She is well-developed. She is obese.   HENT:      Head: Normocephalic and atraumatic.   Eyes:      General: Lids are normal.      Conjunctiva/sclera: Conjunctivae normal.   Neck:      Trachea: Trachea normal.   Cardiovascular:      Rate and Rhythm: Normal rate and regular rhythm.      Heart sounds: Normal heart sounds.   Pulmonary:      Effort: Pulmonary effort is normal.      Breath sounds: Normal breath sounds.   Abdominal:      General: Bowel sounds are normal. There is no distension.      Palpations: Abdomen is soft.      Tenderness: There is no abdominal tenderness.      Hernia:  No hernia is present.   Musculoskeletal:      Cervical back: Normal range of motion.   Skin:     General: Skin is warm and dry.   Neurological:      Mental Status: She is alert and oriented to person, place, and time.   Psychiatric:         Speech: Speech normal.         Behavior: Behavior normal. Behavior is cooperative.         Assessment:       Problem List Items Addressed This Visit          Endocrine    Metabolic syndrome     Other Visit Diagnoses       BMI 50.0-59.9, adult    -  Primary            Plan:          39-year-old female with a BMI of 58.  Extensive conversation was had with the patient regarding robotic sleeve gastrectomy and gastric bypass.  The patient will be placed in where bariatric program and once she has  completed she will see me again for definitive surgical planning and scheduling.

## 2024-06-05 ENCOUNTER — PATIENT MESSAGE (OUTPATIENT)
Dept: SURGERY | Facility: CLINIC | Age: 39
End: 2024-06-05
Payer: COMMERCIAL

## 2024-06-05 ENCOUNTER — TELEPHONE (OUTPATIENT)
Dept: SURGERY | Facility: CLINIC | Age: 39
End: 2024-06-05
Payer: COMMERCIAL

## 2024-06-05 NOTE — TELEPHONE ENCOUNTER
Called pt to f/u on portal message sent this morning. Pt confirmed appt for tomorrow morning, as well as, understanding about bringing completed nutrition assessment questionnaire and arranging .

## 2024-06-06 ENCOUNTER — NUTRITION (OUTPATIENT)
Dept: SURGERY | Facility: CLINIC | Age: 39
End: 2024-06-06
Payer: COMMERCIAL

## 2024-06-06 VITALS — HEIGHT: 69 IN | BODY MASS INDEX: 43.4 KG/M2 | WEIGHT: 293 LBS

## 2024-06-06 DIAGNOSIS — E88.810 METABOLIC SYNDROME: ICD-10-CM

## 2024-06-06 NOTE — PROGRESS NOTES
"24  1:06 PM    Age: 39 y.o.        Sex: Female        : 1985    Visit #1 for nutrition r/t preparation for bariatric surgery.    Learning Preference: Demonstration, Discussion, Group  Barriers to Learning: None identified  Readiness to Learn/Make changes (1=not very, 5=very): 4      Height: 69"     Weight: 397.6#     BMI: Body mass index is 58.72 kg/m².     Pt attended individual session w/RD. 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'; previous tx d/t cellulitis r/t lymphedema  -previous wt loss methods: Atkins/Low Carb, Diet pills (Adipex), Intermittent Fasting  -not currently following a specific diet/meal plan; eats ~4x/day, less when working  -includes most foods/food groups however limited choice in fruit, doesn't like beans                            -drinks 2-3 sodas and ~48-64oz water per day  -eats out/away from home 2-3x/wk  -does not read food labels  -not currently participating in planned physical activity/exercise  -no tobacco/alcohol use  -family/significant other is supportive, pt has acquaintances that have had wt loss surgery  -has not yet made changes to prepare for bariatric surgery r/t unsure of what to do.    Provided new patient welcome kit and reviewed printed education material r/t:  -Behavior Modification; not skipping meals; choosing nutrient-dense, minimally processed foods; consuming adequate fluid and protein; moving more/increasing physical activity  -Self-monitoring/Tracking  -SMART goals   -Participating in support group (weekly [facility] online support group and monthly bariatric center in-person support group)  -Basic guidelines for evaluating a food label  -Using the bariatric plate  Specifically noted registration required for [facility] online support group; " encouraged pt to register soon and participate regularly. Also, enc pt to explore other resources (articles, videos, facebook page) available on [employer] website. Emphasized significance of consistent participation in support group for wt loss and long-term maintenance.     Pt's current habits were reviewed and pt was agreeable to hearing suggestions for modifications to help with successful pre-surgery preparation and post-surgical wt loss and maintenance. Pt explains readiness level 4 r/t being picky about food and memory problems/challenges r/t meds.   Pt works nights, stands while working. Reiterated importance of practicing behavior modifications to establish new habits prior to surgery and facilitate an easier transition after surgery and prepare the foundation for a lifestyle that promotes successful wt loss and supports long-tern maintenance.    Pt identified the following goals to focus on for the next 30 days:  -don't drink while eating  -increase fruits and non-starchy vegetables  -decrease/avoid highly processed foods.    Pt scored 15/20 on Bariatric Knowledge Check. RD contact information was noted and pt was enc to call with questions/concerns. At the conclusion of the visit, pt confirmed questions and concerns were addressed and denied further needs. Pt to attend nutrition class and return for f/u visit in 3 weeks.

## 2024-06-12 ENCOUNTER — PATIENT MESSAGE (OUTPATIENT)
Dept: SURGERY | Facility: CLINIC | Age: 39
End: 2024-06-12
Payer: COMMERCIAL

## 2024-06-20 ENCOUNTER — NUTRITION (OUTPATIENT)
Dept: SURGERY | Facility: CLINIC | Age: 39
End: 2024-06-20
Payer: COMMERCIAL

## 2024-06-20 VITALS — BODY MASS INDEX: 43.4 KG/M2 | WEIGHT: 293 LBS | HEIGHT: 69 IN

## 2024-06-20 NOTE — PROGRESS NOTES
"Pt attended Nutrition Education group class on 6/20/2024 with 1 other pt.   Information was presented and discussed r/t establishing new habits and basic nutrition guidelines:  -Behavior changes r/t dietary habits, physical activity, socializing, drinking, and smoking  -General diet guidelines before surgery, related expectations, and possible challenges  -Reading food labels, tracking intake/activity, and other practices to support healthy behaviors  -Fluid and protein intake, importance, function, amount/d  -Support group, benefits, purpose, attendance, on-line options.  -Setting SMART goals.  -Pt/support persons familiarizing themselves with all printed material/instructions from ALL providers to facilitate informed preparation, recovery, and maintenance care.  Pt was provided printed copy of education material presented/discussed. Questions were addressed throughout presentation and upon conclusion, pt confirmed understanding of information. Pt was encouraged to contact RD as needed.       Height: 69"     Weight: 394#     BMI: Body mass index is 58.18 kg/m².  "

## 2024-06-27 ENCOUNTER — NUTRITION (OUTPATIENT)
Dept: SURGERY | Facility: CLINIC | Age: 39
End: 2024-06-27
Payer: COMMERCIAL

## 2024-06-27 VITALS — HEIGHT: 69 IN | BODY MASS INDEX: 43.4 KG/M2 | WEIGHT: 293 LBS

## 2024-06-27 NOTE — PROGRESS NOTES
"24  11:15 AM    Last Name: Khoi        First Name: Viktoria    Referral for nutrition r/t preparation for bariatric surgery, visit #2.     Age: 39 y.o.        Sex: Female        : 1985      Vitals:    24 1114   Weight: (!) 176.9 kg (390 lb)   Height: 5' 9" (1.753 m)     Body mass index is 57.59 kg/m².    Baseline Wt (first RD visit): 397.6#     Wt Change (ref last RD visit): -7.6#     Total Wt Change (ref first RD visit): -7.6#    Reviewed pt's goals from previous visit and current habits with the following noted per verbal/written recall:  - fluid per day: 40oz when off, closer to 64oz when working  - protein per day/meal: avg 30-35g/d  - protein supplement: has tried some, hasn't liked many  - tracking intake: written record  - physical activity/exercise: riding bike and walking w/family  - [facility] support group: conts to view.  - meals and snacks/d: 3 meals for sure.     Pt acknowledges tracking is not consistent but has recently downloaded [employer] cecilia and will try to use that. Pt also states challenges r/t finding something that is not processed and reading labels, and also states protein still confuses her. Pt confirms she is better able to concentrate at work to drink adequate amount of water. Pt states she fells good about being more active, decreasing sodas, fast food, and other foods that she thinks are unhealthy. Pt provides verbal example of recall. We reviewed basic guidelines and identified opportunities for modifications. We reviewed more specifics r/t reading food labels, the Guide provided in nutrition class, and using the plate at meals/snacks.   Cont to emphasize significance of long-term participation in support group and enc pt to participate in weekly [facility] online support group and monthly bariatric center in-person support group.    Pt identified the following goals to focus on for the next 30 days:  -sip minimum of 64oz fluid daily  -do not skip meals  -include 2-3oz " protein at meals, 1-2oz at snacks.    At the conclusion of the visit, pt confirmed questions and concerns were addressed and denied further needs. RD contact information was noted and pt was enc to contact [facility] Center via call, MyChart, or email as necessary.     Pt to return in 3 weeks.

## 2024-07-03 ENCOUNTER — PATIENT MESSAGE (OUTPATIENT)
Dept: SURGERY | Facility: CLINIC | Age: 39
End: 2024-07-03
Payer: COMMERCIAL

## 2024-07-06 DIAGNOSIS — E55.9 VITAMIN D DEFICIENCY: ICD-10-CM

## 2024-07-08 RX ORDER — CHOLECALCIFEROL (VITAMIN D3) 1250 MCG
CAPSULE ORAL
Qty: 12 CAPSULE | Refills: 0 | Status: SHIPPED | OUTPATIENT
Start: 2024-07-08

## 2024-07-11 ENCOUNTER — CLINICAL SUPPORT (OUTPATIENT)
Dept: OBSTETRICS AND GYNECOLOGY | Facility: CLINIC | Age: 39
End: 2024-07-11
Payer: COMMERCIAL

## 2024-07-11 DIAGNOSIS — Z01.89 ROUTINE LAB DRAW: Primary | ICD-10-CM

## 2024-07-11 DIAGNOSIS — E03.8 OTHER SPECIFIED HYPOTHYROIDISM: ICD-10-CM

## 2024-07-11 LAB
25(OH)D3 SERPL-MCNC: 39 NG/ML
ABS NRBC COUNT: 0 THOU/UL (ref 0–0.01)
ABSOLUTE BASOPHIL: 0 10*3/UL (ref 0–0.3)
ABSOLUTE EOSINOPHIL: 0.1 10*3/UL (ref 0–0.6)
ABSOLUTE IMMATURE GRAN: 0.02 THOU/UL (ref 0–0.03)
ABSOLUTE LYMPHOCYTE: 1.9 10*3/UL (ref 1.2–4)
ABSOLUTE MONOCYTE: 0.4 10*3/UL (ref 0.1–0.8)
BASOPHILS NFR BLD: 0.6 % (ref 0–3)
EOSINOPHIL NFR BLD: 1.1 % (ref 0–6)
ERYTHROCYTE [DISTWIDTH] IN BLOOD BY AUTOMATED COUNT: 15.2 % (ref 0–15.5)
ESTIMATED AVG GLUCOSE: 119 MG/DL
HBA1C MFR BLD: 5.5 % (ref 4.2–6.3)
HCT VFR BLD AUTO: 39.9 % (ref 37–47)
HGB BLD-MCNC: 12.7 G/DL (ref 12–16)
IMMATURE GRANULOCYTES: 0.3 % (ref 0–0.43)
LYMPHOCYTES NFR BLD: 26.4 % (ref 20–45)
MCH RBC QN AUTO: 26.1 PG (ref 27–32)
MCHC RBC AUTO-ENTMCNC: 31.8 % (ref 32–36)
MCV RBC AUTO: 82.1 FL (ref 80–99)
MONOCYTES NFR BLD: 5.1 % (ref 2–10)
NEUTROPHILS # BLD AUTO: 4.7 10*3/UL (ref 1.4–7)
NEUTROPHILS NFR BLD: 66.5 % (ref 50–80)
NUCLEATED RED BLOOD CELLS: 0 % (ref 0–0.2)
PLATELETS: 211 10*3/UL (ref 130–400)
PMV BLD AUTO: 11.6 FL (ref 9.2–12.2)
RBC # BLD AUTO: 4.86 10*6/UL (ref 4.2–5.4)
TSH SERPL DL<=0.005 MIU/L-ACNC: 2.75 UIU/ML (ref 0.36–3.74)
WBC # BLD: 7.1 10*3/UL (ref 4.5–10)

## 2024-07-11 RX ORDER — LEVOTHYROXINE SODIUM 50 UG/1
50 TABLET ORAL
Qty: 90 TABLET | Refills: 1 | Status: SHIPPED | OUTPATIENT
Start: 2024-07-11

## 2024-07-15 ENCOUNTER — PATIENT MESSAGE (OUTPATIENT)
Dept: SURGERY | Facility: CLINIC | Age: 39
End: 2024-07-15
Payer: COMMERCIAL

## 2024-07-16 ENCOUNTER — NUTRITION (OUTPATIENT)
Dept: SURGERY | Facility: CLINIC | Age: 39
End: 2024-07-16
Payer: COMMERCIAL

## 2024-07-16 ENCOUNTER — CLINICAL SUPPORT (OUTPATIENT)
Dept: OBSTETRICS AND GYNECOLOGY | Facility: CLINIC | Age: 39
End: 2024-07-16
Payer: COMMERCIAL

## 2024-07-16 VITALS — BODY MASS INDEX: 43.4 KG/M2 | WEIGHT: 293 LBS | HEIGHT: 69 IN

## 2024-07-16 DIAGNOSIS — Z01.89 ROUTINE LAB DRAW: Primary | ICD-10-CM

## 2024-07-16 DIAGNOSIS — E88.810 METABOLIC SYNDROME: ICD-10-CM

## 2024-07-16 PROCEDURE — 36415 COLL VENOUS BLD VENIPUNCTURE: CPT | Mod: S$GLB,,, | Performed by: SURGERY

## 2024-07-16 NOTE — PROGRESS NOTES
"11:08 AM    Last Name: Khoi        First Name: Viktoria    Referral for nutrition r/t preparation for bariatric surgery, visit #3.     Age: 39 y.o.        Sex: Female        : 1985      Vitals:    24 1108   Weight: (!) 175.1 kg (386 lb)   Height: 5' 9" (1.753 m)     Body mass index is 57 kg/m².    Baseline Wt (first RD visit): 397.6#     Wt Change (ref last RD visit): -11.6#     Total Wt Change (ref first RD visit): -4#    Reviewed pt's goals from previous visit and current habits with the following noted per verbal/written recall:  - fluid per day:70-80oz  - protein per day/meal: ~80g avg  - protein supplement: ordered Premier today  - tracking intake: [employer]ecilia  - physical activity/exercise: more activity w/kids but not as much purposeful r/t hurt back at work  - [facility] support group: conts to view  - meals and snacks/d: 3/2.    Pt acknowledges she is more thoughtful when eating and now more aware of trigger foods. Pt provides verbal example of recall. We reviewed basic guidelines and identified opportunities for modifications. We discussed strategies to make healthier choices. Cautioned pt about processed choices like rice cakes and protein chips and revisited reading food label w/examples. Enc pt to use Guide Book as reference.  Cont to emphasize significance of long-term participation in support group and enc pt to participate in weekly [facility] online support group and monthly bariatric center in-person support group.    Pt identified the following goals to focus on for the next 30 days:  -increase whole grains  -take smaller bites  -don't drink while eating.    At the conclusion of the visit, pt confirmed questions and concerns were addressed and denied further needs. RD contact information was noted and pt was enc to contact Bariatric Center via call, MyChart, or email as necessary.     Pt to return in 3 weeks.  "

## 2024-07-18 LAB — VITAMIN B1, WHOLE BLOOD: 135 NMOL/L (ref 70–180)

## 2024-07-23 LAB — VITAMIN B1, WHOLE BLOOD: 123 NMOL/L (ref 70–180)

## 2024-07-24 ENCOUNTER — OFFICE VISIT (OUTPATIENT)
Dept: PRIMARY CARE CLINIC | Facility: CLINIC | Age: 39
End: 2024-07-24
Payer: COMMERCIAL

## 2024-07-24 VITALS
WEIGHT: 293 LBS | OXYGEN SATURATION: 96 % | HEIGHT: 69 IN | SYSTOLIC BLOOD PRESSURE: 128 MMHG | TEMPERATURE: 98 F | BODY MASS INDEX: 43.4 KG/M2 | RESPIRATION RATE: 18 BRPM | DIASTOLIC BLOOD PRESSURE: 83 MMHG | HEART RATE: 96 BPM

## 2024-07-24 DIAGNOSIS — F41.9 ANXIETY AND DEPRESSION: ICD-10-CM

## 2024-07-24 DIAGNOSIS — E03.8 OTHER SPECIFIED HYPOTHYROIDISM: Primary | ICD-10-CM

## 2024-07-24 DIAGNOSIS — I82.593 CHRONIC DEEP VEIN THROMBOSIS (DVT) OF OTHER VEIN OF BOTH LOWER EXTREMITIES: ICD-10-CM

## 2024-07-24 DIAGNOSIS — D50.0 IRON DEFICIENCY ANEMIA DUE TO CHRONIC BLOOD LOSS: ICD-10-CM

## 2024-07-24 DIAGNOSIS — F32.A ANXIETY AND DEPRESSION: ICD-10-CM

## 2024-07-24 DIAGNOSIS — E66.01 CLASS 3 SEVERE OBESITY DUE TO EXCESS CALORIES WITH SERIOUS COMORBIDITY AND BODY MASS INDEX (BMI) OF 50.0 TO 59.9 IN ADULT: ICD-10-CM

## 2024-07-24 DIAGNOSIS — E55.9 VITAMIN D DEFICIENCY: ICD-10-CM

## 2024-07-24 PROCEDURE — 99214 OFFICE O/P EST MOD 30 MIN: CPT | Mod: ,,, | Performed by: NURSE PRACTITIONER

## 2024-07-24 PROCEDURE — 3008F BODY MASS INDEX DOCD: CPT | Mod: CPTII,,, | Performed by: NURSE PRACTITIONER

## 2024-07-24 PROCEDURE — 3044F HG A1C LEVEL LT 7.0%: CPT | Mod: CPTII,,, | Performed by: NURSE PRACTITIONER

## 2024-07-24 PROCEDURE — 3079F DIAST BP 80-89 MM HG: CPT | Mod: CPTII,,, | Performed by: NURSE PRACTITIONER

## 2024-07-24 PROCEDURE — 1160F RVW MEDS BY RX/DR IN RCRD: CPT | Mod: CPTII,,, | Performed by: NURSE PRACTITIONER

## 2024-07-24 PROCEDURE — 3074F SYST BP LT 130 MM HG: CPT | Mod: CPTII,,, | Performed by: NURSE PRACTITIONER

## 2024-07-24 PROCEDURE — 1159F MED LIST DOCD IN RCRD: CPT | Mod: CPTII,,, | Performed by: NURSE PRACTITIONER

## 2024-07-24 NOTE — PATIENT INSTRUCTIONS
RTC in 3 months for F/U or sooner if needed.    Keep appts with specialists as scheduled.    Patient Education       How to Prevent Blood Clots   About this topic   A blood clot found in a deep vein inside your leg is a deep vein thrombosis or venous thromboembolism. It is also called a DVT or VTE. You may have pain, redness, itching, burning, or swelling along the vein. DVTs are most common in the lower leg in your calf but can also happen in other places in your body. The blood clot can travel to other parts of your body and cause serious problems. If a clot travels to the lungs, it will be hard for you to breathe. It can also lead to death.  Because of this, it is important to learn about blood clots and work to prevent them. You are more likely to have blood clots if you:  Have had surgery or will be spending a lot of time resting in bed  Have a history of serious illnesses like heart, lung, or kidney problems  Have a history of blood clots  Have an injury or problem that keeps you from being able to walk or move freely  Are over 40 years old and do not exercise a lot  Have been traveling and sitting for more than 4 hours at a time, especially on an airplane  Smoke  Are overweight       What will the results be?   You will be less likely to have a blood clot.  What drugs may be needed?   The doctor may order drugs called blood thinners. Some blood thinners are taken as a pill and some are taken as a shot into your stomach area. Blood thinners decrease the ability of the blood to clot. Too much of this drug may cause bleeding. Talk to your doctor about all the drugs you take, including aspirin. This includes over-the-counter (OTC) drugs and herbal products or supplements.  What problems could happen?   You may have a blood clot, even though you are trying to prevent them. If you have a blood clot and it moves, this could lead to death.  What can be done to prevent this health problem?   There are many ways to  help prevent blood clots. Your doctor will make a plan based on your condition. The doctor will also consider how likely you are to get a blood clot and your risk of bleeding if you would take a blood thinner.  When you are in the hospital or going to have surgery:  Talk to your doctor if you are on estrogen therapy. The doctor may ask you to stop taking this drug for a time before and after surgery.  Talk to your doctor about all other drugs you are taking. You may have to stop some other drugs before surgery.  You may have to use a special machine that squeezes and releases on your legs. This helps to keep the blood moving in your legs.  You may be asked to get out of bed soon after a surgery or while being treated for an illness.  Make sure you take all your blood thinner drugs exactly as ordered. Too much can cause bleeding and too little can allow your blood to clot.  While you are in bed or in a chair:  Prop your leg on a pillow when in bed and on a chair or footstool when you sit. Do not put the pillow under your knee sideways so that your knee is bent in bed. Instead, put it long ways to support your knee and lower leg.  To help increase blood flow to your lower legs, point your toes and then bring them back toward your knees. Repeat this motion 10 times or more at least each hour while you are awake.  Your doctor may advise you to wear compression stockings to help with blood flow.  Do not cross your legs  When at home or traveling:  Move around as much as you are able.  Do not sit for long periods of time. Get up and move around every 1 to 2 hours, unless you are sleeping.  Walking can speed up recovery. It may also help prevent blood clots.  Wear loose-fitting clothing around your legs and waist.  Stretch your legs or wiggle your toes during long trips. Flex and extend your knees and ankles. Change position often.  Wear compression stockings to help with blood flow.  Do not cross your legs while sitting  or lying in bed.  Drink 6 to 8 glasses of water each day.  Avoid agents that might make you sleep for a long time. Do not drink beer, wine, and mixed drinks (alcohol) or take sleeping pills.  Do not smoke.  If you are overweight, talk to your doctor about a weight loss plan that will work for you.  When do I need to call the doctor?   Go to the emergency room or seek emergency help if:  Sudden shortness of breath  Sudden onset of chest pain  Coughing up blood  Weakness on one side of the body, unable to move one arm or the other, unequal smile, difficulty speaking or thinking clearly, falling down, and unable to support yourself.  Call your doctor if:  Pale or blue colored skin or your skin feels cold to touch  Swelling of legs or increase in size of one leg  No feeling in your leg or foot  Tingling sensation in your leg or foot that does not go away  Pain in your lower or upper legs when walking, pain that gets worse with walking or keeps you from walking  Pain in the back of your lower leg muscle when you point your toes and then flex your foot  Where can I learn more?   Agency for Healthcare Research and Quality  https://www.ahrq.gov/patients-consumers/prevention/disease/bloodclots.html   Centers for Disease Control  https://www.cdc.gov/ncbddd/dvt/travel.html   NHS Choices  https://www.nhs.uk/conditions/blood-clots/   Last Reviewed Date   2020-04-22  Consumer Information Use and Disclaimer   This information is not specific medical advice and does not replace information you receive from your health care provider. This is only a brief summary of general information. It does NOT include all information about conditions, illnesses, injuries, tests, procedures, treatments, therapies, discharge instructions or life-style choices that may apply to you. You must talk with your health care provider for complete information about your health and treatment options. This information should not be used to decide whether or  "not to accept your health care providers advice, instructions or recommendations. Only your health care provider has the knowledge and training to provide advice that is right for you.  Copyright   Copyright © 2021 [employer]To[employer], [employer]. and its affiliates and/or licensors. All rights reserved.    Patient Education       Low Cholesterol, Saturated Fat, and Trans Fat Diet   About this topic   Cholesterol, saturated fat, and trans fat are in many foods. These may raise your blood cholesterol levels. If your cholesterol is too high, this can cause health problems in your heart, liver, kidneys, and even your eyes. The key to lowering your risk of heart problems is to lower your bad fat intake.  Saturated fats and trans fats are the bad fats. These fats clog your arteries and raise your bad cholesterol. Saturated fats and trans fats are solid fats at room temperature. Saturated fats are animal fats. Trans fats are manmade fats. They add flavor to a lot of packaged foods. Staying away from saturated and trans fats will help your heart.  When you do eat foods with fat, make sure they have the good fats. Monounsaturated and polyunsaturated fats are good fats. These fats help raise your good cholesterol and protect your heart.  General   How to Lower Fat and Cholesterol in Your Diet   Read the labels of the foods you buy from the market to find out how much fat is present. Under 5% of total fat on a label means it is "low fat". Over 20% of total fat on a label means it is high fat.  Eat high fiber foods, like soluble fiber. This type of fiber helps lower cholesterol in the body. Choose oatmeal, fruits (like apples), beans, and nuts to get the most soluble fiber.  Eat foods high in omega-3 fatty acids like lisa seeds, walnuts, salmon, tuna, trout, herring, flaxseed, and soybeans. These foods help keep the heart healthy.  Limit your bad fat and oil intake.  Stay away from butter, stick margarine, shortening, lard, and palm and " coconut oil. Pick plant-based spreads instead.  Limit mayonnaise, salad dressings, gravies, and sauces, unless it is made from low-fat ingredients.  Limit chocolate.  Do not eat high-fat processed foods like hot dogs, aguilar, sausage, ham and other luncheon meats high in fat, and some frozen foods. Pick fish, chicken, turkey, and lean meats instead.  Eat more dried beans, lentils, and tofu to get your protein.  Do not eat organ meats, like liver.  Choose nonfat or low-fat milk, yogurt, and cheese.  Use light or fat-free cream cheese and sour cream.  Eat lots of fruits and vegetables.  Pick whole grain breads, cereals, pastas, and rice.  Do not eat snacks that are high in fats like granola, cookies, pies, pastries, doughnuts, and croissants.  Stay away from deep fried foods.  Help When Cooking   Remove the fat portion of meats and the skin from poultry before cooking.  Bake, broil, grill, poach, or roast poultry, fish, and lean meats.  Drain and throw away the fat that drains out of meat as you cook it.  Try to add little or no fat to foods.  Use olive or canola oil for cooking or baking.  Steam your vegetables.  Use herbs or no-oil marinades to flavor foods.         Who should use this diet?   This diet is for people who are at high risk of getting health problems like heart disease, high blood pressure, diabetes, and others. This diet is also good for all people to follow to keep your heart healthy.  What foods are good to eat?   Foods with good fats are:  Canola, peanut, and olive oil  Safflower, soybean, and corn oil  Walnuts, almonds, cashews, and peanuts  Pumpkin and sunflower seeds  [place] and tuna  Tofu  Soymilk  Avocado  What foods should be limited or avoided?   Stay away from these types of foods that have saturated fats:  Whole fat dairy products like cheese, ice cream, whole milk, and cream  Palm and coconut oils  High-fat meats like beef, lamb, poultry with the skin, aguilar, and sausage  Butter and  lard  Stay away from these types of foods that may have trans fat:  Cookies, cakes, candy, doughnuts, baked goods, muffins, pizza dough, and pie crusts that are packaged  Fried foods  Frozen dinners  Chips and crackers  Microwave popcorn  Stick margarine and vegetable shortenings  Helpful tips   To help stay away from saturated fat:  Pick lean cuts of meat  Take the skin off chicken and turkey or pick skinless  Pick low-fat cheese, milk, and ice cream  Use liquid oils when cooking and baking, such as olive oil and canola oil  To help stay away from trans fat:  Look at your labels. Choose foods with 0% trans fat. Read the ingredient list. Avoid foods with partially hydrogenated oil in the ingredient list. This means there is trans fat in the product.  Where can I learn more?   American Heart Association   http://www.heart.org/HEARTORG/Conditions/Cholesterol/PreventionTreatmentofHighCholesterol/Know-Your-Fats_[facility]_305628_Article.jsp   Last Reviewed Date   2021-10-05  Consumer Information Use and Disclaimer   This information is not specific medical advice and does not replace information you receive from your health care provider. This is only a brief summary of general information. It does NOT include all information about conditions, illnesses, injuries, tests, procedures, treatments, therapies, discharge instructions or life-style choices that may apply to you. You must talk with your health care provider for complete information about your health and treatment options. This information should not be used to decide whether or not to accept your health care providers advice, instructions or recommendations. Only your health care provider has the knowledge and training to provide advice that is right for you.   Copyright   Copyright © 2021 UpToDate, Inc. and its affiliates and/or licensors. All rights reserved.    Patient Education       Anxiety Discharge Instructions, Adult   About this topic   Anxiety can cause you  to feel very worried. It can also cause physical symptoms like chest pain, stomach aches, or trouble sleeping. While mild anxiety is a normal response to stress, it can cause you problems in your everyday life. You may need follow-up care to help manage your anxiety. Anxiety happens in many forms, like:  Being scared all the time that something bad is going to happen. This is generalized anxiety.  Strong bursts of fear where your body has signs that may feel like a heart attack. This is called a panic attack.  Upsetting thoughts that happen often. There is a need to repeat doing certain things to help get rid of the anxiety caused by these thoughts. The thoughts or actions may be about checking on things, touching things, or worry about germs. This is an obsessive-compulsive disorder.  Strong fear of an object, place, or condition. This is a phobia.  Fear that others think bad things about you or being put down by other people. This is social anxiety.  Nightmares, flashbacks, staying away from people, or having panic attacks when reminded of a shocking or hurtful time or place from the past. This is post-traumatic stress.  Anxiety disorder may be treated in many ways. Some kinds of treatment have you talk about your beliefs, fears, and worries. You may learn how certain thoughts or feelings can raise anxiety. You may also learn what steps to take to lower anxiety. Other kinds of treatment may have you look back on a hurtful event, sad memory, or something you are afraid of. The doctor will help you deal with the feelings that you may have. You may learn ways to cope with unwanted events or thoughts by looking at your fears in a way that feels safe.  What care is needed at home?   Ask your doctor what you need to do when you go home. Make sure you ask questions if you do not understand what the doctor says.  Set a time to talk with a counselor about your worries and feelings. This can help you with your  anxiety.  Take care to follow all instructions when you take your medicines.  Limit alcohol and caffeine.  Learn ways to manage stress. Relaxation methods like reflection, deep breathing, and muscle relaxation may be helpful. Things like yoga, exercise, and kate chi are also good.  Talk about your feelings with family members and friends you trust. Talk to someone who can help you see how your thoughts at certain times may raise your anxiety.     What follow-up care is needed?   Your doctor may ask you to make visits to the office to check on your progress. Be sure to keep these visits.  What drugs may be needed?   The doctor may order drugs to help the physical signs of anxiety. Make sure that you take the drugs as taught to you by the doctor. Talk with your doctor about any side effects and ask how long you should take the drug.  Will physical activity be limited?   You may take part in physical activities. Some people are limited because of their anxiety or fear. Talk with your doctor about the right amount of activity for you.  What changes to diet are needed?   Eat a variety of healthy foods and limit drinks with caffeine. You should avoid alcohol, energy drinks, and over-the-counter stimulants.  What problems could happen?   If your anxiety is not treated, it can result in:  Staying away from work or social events  Not being able to do everyday tasks  Keeping away from family and friends  What can be done to prevent this health problem?   Learn what events, people, or things upset you. Limit your contact with them.  Talk about your feelings. Talk to someone who can help you see how your thoughts at certain times may raise your anxiety.  Seek support from your friends and family. Find someone who calms you down. Ask if you can call them when you are getting anxious.  When do I need to call the doctor?   You feel you may harm yourself or someone else.  You can also call a mental health hotline for help.  You have  any physical symptoms, such as chest pain, trouble breathing, or severe belly pain, that could be a sign of a serious problem.  If you are short of breath.  If you do not feel like you can be alone.  Teach Back: Helping You Understand   The Teach Back Method helps you understand the information we are giving you. After you talk with the staff, tell them in your own words what you learned. This helps to make sure the staff has described each thing clearly. It also helps to explain things that may have been confusing. Before going home, make sure you can do these:  I can tell you about my condition and the drugs I need to take.  I can tell you what may help lower my anxiety.  I can tell you what I will do if it is hard to breathe or I have chest pain.  I can tell you what I will do if I do not feel safe or cannot be alone.  Where can I learn more?   [employer]  https://www.adolfo.org/Learn-More/Mental-Health-Conditions/Anxiety-Disorders   National Health Service  https://www.nhs.uk/conditions/generalised-anxiety-disorder/symptoms/   [place] of Health ? [facility]  https://www.abhay.nih.gov/health/relieving-stress-anxiety-ptxtqsmnd-ngjokzpqpy-rozkriffqf   [place] of Mental Health  http://www.nimh.nih.gov/health/publications/anxiety-disorders/complete-index.shtml   Last Reviewed Date   2021-06-08  Consumer Information Use and Disclaimer   This information is not specific medical advice and does not replace information you receive from your health care provider. This is only a brief summary of general information. It does NOT include all information about conditions, illnesses, injuries, tests, procedures, treatments, therapies, discharge instructions or life-style choices that may apply to you. You must talk with your health care provider for complete information about your health and treatment options. This information should not be used to decide whether or not to accept your health care providers  advice, instructions or recommendations. Only your health care provider has the knowledge and training to provide advice that is right for you.  Copyright   Copyright © 2021 UpToDate, Inc. and its affiliates and/or licensors. All rights reserved.    Patient Education       Depression Discharge Instructions, Adult   About this topic   Depression is different than normal sadness. Depression can make it hard for you to work, sleep, and enjoy life. Signs of depression include:  No longer enjoying or caring about doing the things you used to.  Feeling sad, down, hopeless, or cranky most of the day, almost every day.  Losing or gaining weight without trying to do so.  Sleeping too much or too little.  Feeling tired or like you have no energy.  Feeling guilty or like you are worth nothing.  Forgetting things or feeling confused.  Moving and speaking more slowly than usual.  Acting restless or having trouble staying still.  Depression is caused by chemicals in your brain being out of balance. Treatment for depression may take a little while to start working.     What care is needed at home?   Ask your doctor what you need to do when you go home. Make sure you ask questions if you do not understand what the doctor says.  Go to counseling or support groups as suggested by your doctor.  Avoid beer, wine, and mixed drinks. Also avoid marijuana and illegal drugs.  Speak with trusted family and friends about your depression and how they can help.  Exercise each day. Try to spend time outside each day. Sunshine may make you feel better.  Have a regular sleep pattern and try to get 6 to 8 hours of sleep each night.  Learn how to cope with stress. Guided imagery, yoga, kate chi, or deep breathing may help reduce your signs.  What follow-up care is needed?   Depression needs to be watched closely. Your doctor may ask you to make visits to the office to check on your progress. Be sure to keep these visits.  What drugs may be needed?    The doctor may order drugs to:  Treat low mood  Improve sleep  Relieve distress and tension  Will physical activity be limited?   Physical activity like sports and exercise may help with recovery. Talk to your doctor about what activity will be good. Ask your doctor if you need help to manage any tiredness the drugs may cause.  What changes to diet are needed?   Eating a healthy diet is important during this time. This means:  Eat whole grain foods and foods high in fiber.  Choose many different fruits and vegetables. Fresh or frozen is best.  Eat less solid fats like butter or margarine. Eat less fatty or processed foods.  Eat more low-fat or lean meats like chicken, fish, or turkey. Eat less red meat.  Avoid caffeine. Try not to drink soda, coffee, tea, or energy drinks.  What can be done to prevent this health problem?   Exercise each day.  Try to spend time outside each day. Sunshine can make you feel better.  Have a regular sleep pattern where you get 6 to 8 hours of sleep each night.  Learn how to cope with stress. Guided imagery, yoga, kate chi, or deep breathing may help relieve your signs.  Learn about depression and its signs. Then, you can get help early.  Join a support group. Learn how others are living well with depression.  When do I need to call the doctor?   You have thoughts of hurting yourself or someone else.  Your depression does not get better within 1 or 2 weeks.  Your depression is getting worse.  Your family and friends say they are worried about you.  You continue to have problems eating or sleeping.  You are functioning poorly at work, at home, or in school.  Teach Back: Helping You Understand   The Teach Back Method helps you understand the information we are giving you. After you talk with the staff, tell them in your own words what you learned. This helps to make sure the staff has described each thing clearly. It also helps to explain things that may have been confusing. Before going  home, make sure you can do these:  I can tell you about my condition.  I can tell you what may help me relax and ease my stress.  I can tell you what I will do if I think about hurting myself or someone else or if my depression is not getting better.  Where can I learn more?   American Psychiatric Association  https://www.psychiatry.org/patients-families/depression/what-is-depression   Centers for Disease Control and Prevention  https://www.cdc.gov/learnmorefeelbetter/programs/depression.htm   National Association of Mental Health  https://www.que.org/learn-more/mental-health-conditions/depression   [employer]  https://www.katena.com/contents/depression-in-adults-beyond-the-basics   Last Reviewed Date   2020-06-10  Consumer Information Use and Disclaimer   This information is not specific medical advice and does not replace information you receive from your health care provider. This is only a brief summary of general information. It does NOT include all information about conditions, illnesses, injuries, tests, procedures, treatments, therapies, discharge instructions or life-style choices that may apply to you. You must talk with your health care provider for complete information about your health and treatment options. This information should not be used to decide whether or not to accept your health care providers advice, instructions or recommendations. Only your health care provider has the knowledge and training to provide advice that is right for you.  Copyright   Copyright © 2021 UpToDate, Inc. and its affiliates and/or licensors. All rights reserved.

## 2024-07-24 NOTE — PROGRESS NOTES
Subjective:       Patient ID: Viktoria Westfall is a 39 y.o. female.    Chief Complaint: Follow-up (Pt c/o left leg pain. She states she has  hx of DVT. She is taking aspirin, with little to no improvement. )    HPI: Viktoria is a 39 y.o. female who presents for F/U. Children present during visit.    Mrs. Westfall c/o left low leg pain and she has had DVT's in the past. She is taking aspirin but she admits she has missed three days of the Aspirin because she had run out but she has since picked up more Aspirin from her pharmacy and is back taking it daily. Mrs. Westfall has lymphedema to both of her lower legs so she was referred to therapy and was taught exercises to do to help. She could not afford to do the therapy due to costs so she has been doing the exercises instructed by the Therapist on her own. She says she was seen by a vein specialist and due to her damage from her DVT's her blood pumps irregularly. Left leg is more swollen than the right.     Labs recently drawn and her thyroid panel is now normal after increasing the levothyroxine to 50 mcg daily. Vitamin D level now normal after taking high dose Vitamin D  weekly. A1C improved and is down to 5.5 from 5.9 previously after eating a healthier diet.     Mrs. Westfall has battled obesity several years even after a healthy diet and exercise so she was referred to Dr. Herrera for weight loss surgery and she is working on items needed prior to her surgery being done, including seeing a Nutritionist regularly along with Psychiatry, Dr. Lazo and Dr. Herrera. Her BMI is now down to 56.35 from 58.94. She was last seen in June by Dr. Herrera with discussion on having a robotic sleeve gastrectomy and gastric bypass so she was placed in the bariatric program and once she has completed, she will see Dr. Herrera again for definitive surgical planning and scheduling. Her next appt is on 08/06/24 with the Nutritionist. She will also be having a home sleep study done soon.    Mrs.  Khoi underwent a D&C in September due to her having abnormal cells on her PAP smear but it turned out to be benign by Dr. Briceno. Followed by Keeley Abdi NP for GYN. She is not on any birth control due to her having Factor 5 Leiden and developing a blood clot previously while on birth control. She takes Aspirin daily. Reports irregular menstrual cycles. Next appt is in September.    Also with some Depression but she has been dealing with it on her own. She was taking fluoxetine 10 mg daily but stopped taking it because she did not like the way it made her feel.    Denies smoking or drinking.    Refuses COVID and FLU vaccines.              Past Medical History:   Diagnosis Date    Anxiety disorder, unspecified     Deep vein thrombosis     Depression     Factor 5 Leiden mutation, heterozygous     H/O blood clots     Herpes simplex virus (HSV) infection     HPV exposure     Hyperlipidemia     Thyroid disease        Past Surgical History:   Procedure Laterality Date    ADENOIDECTOMY       SECTION      x 3    DILATION AND CURETTAGE OF UTERUS      x2    TONSILLECTOMY         Family History   Problem Relation Name Age of Onset    Diabetes Mother Poppy     Arthritis Mother Poppy     Stroke Mother Poppy     Diabetes Maternal Uncle Mago     Heart disease Maternal Grandfather [facility]     Drug abuse Brother [facility]     Early death Sister [facility]        Social History     Tobacco Use    Smoking status: Never    Smokeless tobacco: Never   Substance Use Topics    Alcohol use: Not Currently    Drug use: Never       Patient Active Problem List   Diagnosis    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   HENT:  Negative for tinnitus and trouble swallowing.    Eyes:  Negative for visual disturbance.   Respiratory:  Negative for cough, chest tightness, shortness of breath and wheezing.    Cardiovascular:  Negative for chest pain, palpitations and leg swelling.   Gastrointestinal:  Negative for abdominal distention, abdominal pain, blood in stool, constipation, diarrhea, nausea and vomiting.   Endocrine: Negative for polydipsia, polyphagia and polyuria.   Genitourinary:  Negative for decreased urine volume, dysuria, frequency, hematuria and urgency.   Musculoskeletal:  Positive for arthralgias (bilateral low legs). Negative for back pain, gait problem, joint swelling, neck pain and neck stiffness.   Skin:  Negative for color change and rash.   Neurological:  Negative for dizziness, syncope, weakness, light-headedness, numbness and headaches.   Psychiatric/Behavioral:  Negative for behavioral problems, confusion and dysphoric mood. The patient is not nervous/anxious.      Objective:     Vitals:    07/24/24 0941   BP: 128/83   BP Location: Left arm   Patient Position: Sitting   BP Method: Large (Automatic)   Pulse: 96   Resp: 18   Temp: 98.4 °F (36.9 °C)   TempSrc: Oral   SpO2: 96%   Weight: (!) 173.1 kg (381 lb 9.6 oz)   Height: 5' 9" (1.753 m)       Physical Exam  Vitals and nursing note reviewed.   Constitutional:       General: She is not in acute distress.     Appearance: Normal appearance. She is not diaphoretic.   HENT:      Head: Normocephalic and atraumatic.      Mouth/Throat:      Mouth: Mucous membranes are moist.      Pharynx: Oropharynx is clear.   Eyes:      Extraocular Movements: Extraocular movements intact.      Conjunctiva/sclera: Conjunctivae normal.      Pupils: Pupils are equal, round, and reactive to light. "   Cardiovascular:      Rate and Rhythm: Normal rate and regular rhythm.      Heart sounds: Normal heart sounds.   Pulmonary:      Effort: Pulmonary effort is normal.      Breath sounds: Normal breath sounds. No stridor. No wheezing or rales.   Abdominal:      General: There is no distension.      Tenderness: There is no abdominal tenderness.   Musculoskeletal:         General: Tenderness (lower legs) present. No swelling or signs of injury. Normal range of motion.      Cervical back: Normal range of motion.      Right lower leg: No edema.      Left lower leg: No edema.   Lymphadenopathy:      Cervical: No cervical adenopathy.   Skin:     General: Skin is warm and dry.      Capillary Refill: Capillary refill takes less than 2 seconds.      Findings: No rash.   Neurological:      Mental Status: She is alert and oriented to person, place, and time.   Psychiatric:         Mood and Affect: Mood and affect normal.         Behavior: Behavior normal. Behavior is cooperative.         Orders Only on 07/16/2024   Component Date Value Ref Range Status    VITAMIN B1, WHOLE BLOOD 07/16/2024 123  70 - 180 nmol/L Final   Orders Only on 07/10/2024   Component Date Value Ref Range Status    VITAMIN B1, WHOLE BLOOD 07/10/2024 135  70 - 180 nmol/L Final   Orders Only on 07/10/2024   Component Date Value Ref Range Status    Vit D, 25-Hydroxy 07/10/2024 39  >30 ng/mL Final   Orders Only on 07/10/2024   Component Date Value Ref Range Status    Estimated Avg Glucose 07/10/2024 119  mg/dL Final    Hemoglobin A1C 07/10/2024 5.5  4.2 - 6.3 % Final   Orders Only on 07/10/2024   Component Date Value Ref Range Status    TSH 07/10/2024 2.750  0.358 - 3.74 uIU/mL Final   Orders Only on 07/10/2024   Component Date Value Ref Range Status    WBC 07/10/2024 7.1  4.5 - 10.0 10*3/uL Final    RBC 07/10/2024 4.86  4.20 - 5.40 10*6/uL Final    Hemoglobin 07/10/2024 12.7  12.0 - 16.0 g/dL Final    Hematocrit 07/10/2024 39.9  37.0 - 47.0 % Final    MCV  07/10/2024 82.1  80.0 - 99.0 fL Final    MCH 07/10/2024 26.1 (L)  27.0 - 32.0 pg Final    MCHC 07/10/2024 31.8 (L)  32.0 - 36.0 % Final    RDW RBC Auto-Rto 07/10/2024 15.2  0.0 - 15.5 % Final    Platelets 07/10/2024 211  130 - 400 10*3/uL Final    MPV 07/10/2024 11.6  9.2 - 12.2 fL Final    Neutrophils 07/10/2024 66.5  50 - 80 % Final    Immature Granulocytes 07/10/2024 0.30  0.0 - 0.43 % Final    Lymphocytes 07/10/2024 26.4  20.0 - 45.0 % Final    Monocytes 07/10/2024 5.1  2 - 10 % Final    Eosinophils 07/10/2024 1.1  0 - 6 % Final    Basophils 07/10/2024 0.6  0 - 3 % Final    nRBC# 07/10/2024 0.0  0 - 0.2 % Final    Neutrophils Absolute 07/10/2024 4.7  1.4 - 7.0 10*3/uL Final    Immature Granulocytes Absolute 07/10/2024 0.0200  0.0 - 0.0310 thou/uL Final    Lymphocytes Absolute 07/10/2024 1.9  1.2 - 4.0 10*3/uL Final    Monocytes Absolute 07/10/2024 0.4  0.1 - 0.8 10*3/uL Final    Eosinophils Absolute 07/10/2024 0.1  0.0 - 0.6 10*3/uL Final    Basophils Absolute 07/10/2024 0.0  0.0 - 0.3 10*3/uL Final    nRBC Count Absolute 07/10/2024 0.000  0 - 0.012 thou/uL Final   Orders Only on 04/12/2024   Component Date Value Ref Range Status    Vit D, 25-Hydroxy 04/12/2024 19 (L)  >30 ng/mL Final   Orders Only on 04/12/2024   Component Date Value Ref Range Status    Sodium 04/12/2024 137  131 - 143 mmol/L Final    Potassium 04/12/2024 4.3  3.5 - 5.1 mmol/L Final    Chloride 04/12/2024 102  98 - 107 mmol/L Final    CO2 04/12/2024 24  21 - 32 mmol/L Final    Anion Gap 04/12/2024 11.0  3.0 - 11.0 mmol/L Final    BUN 04/12/2024 21.0 (H)  7.0 - 18.0 mg/dL Final    Creatinine 04/12/2024 1.04 (H)  0.55 - 1.02 mg/dL Final    GFR ESTIMATION 04/12/2024 60  >60 Final    BUN/Creatinine Ratio 04/12/2024 20.19  Ratio Final    Glucose 04/12/2024 98  74 - 106 mg/dL Final    Calcium 04/12/2024 8.9  8.5 - 10.1 mg/dL Final    Total Bilirubin 04/12/2024 0.3  0.2 - 1.0 mg/dL Final    AST 04/12/2024 19  15 - 37 U/L Final    ALT 04/12/2024 23  13  - 56 U/L Final    Total Protein 04/12/2024 8.0  6.4 - 8.2 g/dL Final    Albumin 04/12/2024 3.9  3.4 - 5.0 g/dL Final    Alkaline Phosphatase 04/12/2024 92  45 - 117 U/L Final    Cholesterol 04/12/2024 172  <200 mg/dL Final    Triglycerides 04/12/2024 116  0 - 149 mg/dL Final    HDL 04/12/2024 59  >39 mg/dL Final    LDL Cholesterol 04/12/2024 89.8  mg/dL Final    VLDL 04/12/2024 23  mg/dL Final    Free T4 04/12/2024 1.09  0.76 - 1.46 ng/dL Final    TSH 04/12/2024 5.800 (H)  0.358 - 3.74 uIU/mL Final   Orders Only on 04/12/2024   Component Date Value Ref Range Status    Estimated Avg Glucose 04/12/2024 133  mg/dL Final    Hemoglobin A1C 04/12/2024 5.9  4.2 - 6.3 % Final   Orders Only on 04/12/2024   Component Date Value Ref Range Status    Color, UA 04/12/2024 Yellow  Yellow Final    Appearance, UA 04/12/2024 Clear  Clear Final    pH, UA 04/12/2024 5.5  5.0 - 8.0 pH Final    Specific Gravity, UA 04/12/2024 1.035 (H)  1.005 - 1.030 Final    Protein, UA 04/12/2024 20 (A)  Negative mg/dL Final    Glucose, UA 04/12/2024 Negative  Negative mg/dL Final    Ketones, UA 04/12/2024 Negative  Negative mg/dL Final    Occult Blood UA 04/12/2024 Negative  Negative Final    Nitrite, UA 04/12/2024 Negative  Negative Final    Bilirubin (UA) 04/12/2024 Negative  Negative Final    Urobilinogen, UA 04/12/2024 Normal  Normal mg/dL Final    Leukocytes, UA 04/12/2024 Negative  Negative Martha/uL Final    RBC, UA 04/12/2024 2-4 (A)  0 - 2 /HPF Final    WBC, UA 04/12/2024 0-2  0 - 2 /HPF Final    Squam Epithel, UA 04/12/2024 3+ Many (A)  0 - 1+ /LPF Final    Ca Oxalate Daxa, UA 04/12/2024 1+ Few (A)  None Seen /LPF Final    Bacteria 04/12/2024 +/- Rare  0-+/- /HPF Final    MUCUS URINE 04/12/2024 +/-  0-1+ /LPF Final    Service Comment 03 04/12/2024 No, Criteria Not Met   Final   There may be more visits with results that are not included.         Assessment:      1. Other specified hypothyroidism    2. Chronic deep vein thrombosis (DVT) of other  vein of both lower extremities    3. Class 3 severe obesity due to excess calories with serious comorbidity and body mass index (BMI) of 50.0 to 59.9 in adult    4. Vitamin D deficiency    5. Anxiety and depression    6. Iron deficiency anemia due to chronic blood loss          Plan:     Other specified hypothyroidism  -     TSH w/reflex to FT4; Future; Expected date: 08/15/2024    Chronic deep vein thrombosis (DVT) of other vein of both lower extremities  -     Prothrombin w/ INR and PTT; Future; Expected date: 08/15/2024    Class 3 severe obesity due to excess calories with serious comorbidity and body mass index (BMI) of 50.0 to 59.9 in adult    Vitamin D deficiency  -     Vitamin D; Future; Expected date: 08/15/2024    Anxiety and depression    Iron deficiency anemia due to chronic blood loss  -     CBC Auto Differential; Future; Expected date: 08/15/2024         Current Outpatient Medications   Medication Sig Dispense Refill    aspirin 81 MG Chew Take 81 mg by mouth once daily.      FLUoxetine 10 MG capsule       levothyroxine (SYNTHROID) 50 MCG tablet TAKE 1 TABLET BY MOUTH BEFORE BREAKFAST. 90 tablet 1     No current facility-administered medications for this visit.       Medications Discontinued During This Encounter   Medication Reason    DECARA 1,250 mcg (50,000 unit) capsule Therapy completed    ferrous sulfate (FEOSOL) 325 mg (65 mg iron) Tab tablet Therapy completed       Health Maintenance   Topic Date Due    TETANUS VACCINE  10/21/2026    Hepatitis C Screening  Completed    Lipid Panel  Completed       Patient Instructions   RTC in 3 months for F/U or sooner if needed.    Keep appts with specialists as scheduled.    Patient Education       How to Prevent Blood Clots   About this topic   A blood clot found in a deep vein inside your leg is a deep vein thrombosis or venous thromboembolism. It is also called a DVT or VTE. You may have pain, redness, itching, burning, or swelling along the vein. DVTs are  most common in the lower leg in your calf but can also happen in other places in your body. The blood clot can travel to other parts of your body and cause serious problems. If a clot travels to the lungs, it will be hard for you to breathe. It can also lead to death.  Because of this, it is important to learn about blood clots and work to prevent them. You are more likely to have blood clots if you:  Have had surgery or will be spending a lot of time resting in bed  Have a history of serious illnesses like heart, lung, or kidney problems  Have a history of blood clots  Have an injury or problem that keeps you from being able to walk or move freely  Are over 40 years old and do not exercise a lot  Have been traveling and sitting for more than 4 hours at a time, especially on an airplane  Smoke  Are overweight       What will the results be?   You will be less likely to have a blood clot.  What drugs may be needed?   The doctor may order drugs called blood thinners. Some blood thinners are taken as a pill and some are taken as a shot into your stomach area. Blood thinners decrease the ability of the blood to clot. Too much of this drug may cause bleeding. Talk to your doctor about all the drugs you take, including aspirin. This includes over-the-counter (OTC) drugs and herbal products or supplements.  What problems could happen?   You may have a blood clot, even though you are trying to prevent them. If you have a blood clot and it moves, this could lead to death.  What can be done to prevent this health problem?   There are many ways to help prevent blood clots. Your doctor will make a plan based on your condition. The doctor will also consider how likely you are to get a blood clot and your risk of bleeding if you would take a blood thinner.  When you are in the hospital or going to have surgery:  Talk to your doctor if you are on estrogen therapy. The doctor may ask you to stop taking this drug for a time before  and after surgery.  Talk to your doctor about all other drugs you are taking. You may have to stop some other drugs before surgery.  You may have to use a special machine that squeezes and releases on your legs. This helps to keep the blood moving in your legs.  You may be asked to get out of bed soon after a surgery or while being treated for an illness.  Make sure you take all your blood thinner drugs exactly as ordered. Too much can cause bleeding and too little can allow your blood to clot.  While you are in bed or in a chair:  Prop your leg on a pillow when in bed and on a chair or footstool when you sit. Do not put the pillow under your knee sideways so that your knee is bent in bed. Instead, put it long ways to support your knee and lower leg.  To help increase blood flow to your lower legs, point your toes and then bring them back toward your knees. Repeat this motion 10 times or more at least each hour while you are awake.  Your doctor may advise you to wear compression stockings to help with blood flow.  Do not cross your legs  When at home or traveling:  Move around as much as you are able.  Do not sit for long periods of time. Get up and move around every 1 to 2 hours, unless you are sleeping.  Walking can speed up recovery. It may also help prevent blood clots.  Wear loose-fitting clothing around your legs and waist.  Stretch your legs or wiggle your toes during long trips. Flex and extend your knees and ankles. Change position often.  Wear compression stockings to help with blood flow.  Do not cross your legs while sitting or lying in bed.  Drink 6 to 8 glasses of water each day.  Avoid agents that might make you sleep for a long time. Do not drink beer, wine, and mixed drinks (alcohol) or take sleeping pills.  Do not smoke.  If you are overweight, talk to your doctor about a weight loss plan that will work for you.  When do I need to call the doctor?   Go to the emergency room or seek emergency help  if:  Sudden shortness of breath  Sudden onset of chest pain  Coughing up blood  Weakness on one side of the body, unable to move one arm or the other, unequal smile, difficulty speaking or thinking clearly, falling down, and unable to support yourself.  Call your doctor if:  Pale or blue colored skin or your skin feels cold to touch  Swelling of legs or increase in size of one leg  No feeling in your leg or foot  Tingling sensation in your leg or foot that does not go away  Pain in your lower or upper legs when walking, pain that gets worse with walking or keeps you from walking  Pain in the back of your lower leg muscle when you point your toes and then flex your foot  Where can I learn more?   Agency for Healthcare Research and Quality  https://www.ahrq.gov/patients-consumers/prevention/disease/bloodclots.html   Centers for Disease Control  https://www.cdc.gov/ncbddd/dvt/travel.html   NHS Choices  https://www.nhs.uk/conditions/blood-clots/   Last Reviewed Date   2020-04-22  Consumer Information Use and Disclaimer   This information is not specific medical advice and does not replace information you receive from your health care provider. This is only a brief summary of general information. It does NOT include all information about conditions, illnesses, injuries, tests, procedures, treatments, therapies, discharge instructions or life-style choices that may apply to you. You must talk with your health care provider for complete information about your health and treatment options. This information should not be used to decide whether or not to accept your health care providers advice, instructions or recommendations. Only your health care provider has the knowledge and training to provide advice that is right for you.  Copyright   Copyright © 2021 [employer] and its affiliates and/or licensors. All rights reserved.    Patient Education       Low Cholesterol, Saturated Fat, and Trans Fat Diet   About this topic  "  Cholesterol, saturated fat, and trans fat are in many foods. These may raise your blood cholesterol levels. If your cholesterol is too high, this can cause health problems in your heart, liver, kidneys, and even your eyes. The key to lowering your risk of heart problems is to lower your bad fat intake.  Saturated fats and trans fats are the bad fats. These fats clog your arteries and raise your bad cholesterol. Saturated fats and trans fats are solid fats at room temperature. Saturated fats are animal fats. Trans fats are manmade fats. They add flavor to a lot of packaged foods. Staying away from saturated and trans fats will help your heart.  When you do eat foods with fat, make sure they have the good fats. Monounsaturated and polyunsaturated fats are good fats. These fats help raise your good cholesterol and protect your heart.  General   How to Lower Fat and Cholesterol in Your Diet   Read the labels of the foods you buy from the market to find out how much fat is present. Under 5% of total fat on a label means it is "low fat". Over 20% of total fat on a label means it is high fat.  Eat high fiber foods, like soluble fiber. This type of fiber helps lower cholesterol in the body. Choose oatmeal, fruits (like apples), beans, and nuts to get the most soluble fiber.  Eat foods high in omega-3 fatty acids like lisa seeds, walnuts, salmon, tuna, trout, herring, flaxseed, and soybeans. These foods help keep the heart healthy.  Limit your bad fat and oil intake.  Stay away from butter, stick margarine, shortening, lard, and palm and coconut oil. Pick plant-based spreads instead.  Limit mayonnaise, salad dressings, gravies, and sauces, unless it is made from low-fat ingredients.  Limit chocolate.  Do not eat high-fat processed foods like hot dogs, aguilar, sausage, ham and other luncheon meats high in fat, and some frozen foods. Pick fish, chicken, turkey, and lean meats instead.  Eat more dried beans, lentils, and " tofu to get your protein.  Do not eat organ meats, like liver.  Choose nonfat or low-fat milk, yogurt, and cheese.  Use light or fat-free cream cheese and sour cream.  Eat lots of fruits and vegetables.  Pick whole grain breads, cereals, pastas, and rice.  Do not eat snacks that are high in fats like granola, cookies, pies, pastries, doughnuts, and croissants.  Stay away from deep fried foods.  Help When Cooking   Remove the fat portion of meats and the skin from poultry before cooking.  Bake, broil, grill, poach, or roast poultry, fish, and lean meats.  Drain and throw away the fat that drains out of meat as you cook it.  Try to add little or no fat to foods.  Use olive or canola oil for cooking or baking.  Steam your vegetables.  Use herbs or no-oil marinades to flavor foods.         Who should use this diet?   This diet is for people who are at high risk of getting health problems like heart disease, high blood pressure, diabetes, and others. This diet is also good for all people to follow to keep your heart healthy.  What foods are good to eat?   Foods with good fats are:  Canola, peanut, and olive oil  Safflower, soybean, and corn oil  Walnuts, almonds, cashews, and peanuts  Pumpkin and sunflower seeds  [place] and tuna  Tofu  Soymilk  Avocado  What foods should be limited or avoided?   Stay away from these types of foods that have saturated fats:  Whole fat dairy products like cheese, ice cream, whole milk, and cream  Palm and coconut oils  High-fat meats like beef, lamb, poultry with the skin, aguilar, and sausage  Butter and lard  Stay away from these types of foods that may have trans fat:  Cookies, cakes, candy, doughnuts, baked goods, muffins, pizza dough, and pie crusts that are packaged  Fried foods  Frozen dinners  Chips and crackers  Microwave popcorn  Stick margarine and vegetable shortenings  Helpful tips   To help stay away from saturated fat:  Pick lean cuts of meat  Take the skin off chicken and  turkey or pick skinless  Pick low-fat cheese, milk, and ice cream  Use liquid oils when cooking and baking, such as olive oil and canola oil  To help stay away from trans fat:  Look at your labels. Choose foods with 0% trans fat. Read the ingredient list. Avoid foods with partially hydrogenated oil in the ingredient list. This means there is trans fat in the product.  Where can I learn more?   American Heart Association   http://www.heart.org/HEARTORG/Conditions/Cholesterol/PreventionTreatmentofHighCholesterol/Know-Your-Fats_[place] [facility]_305628_Article.jsp   Last Reviewed Date   2021-10-05  Consumer Information Use and Disclaimer   This information is not specific medical advice and does not replace information you receive from your health care provider. This is only a brief summary of general information. It does NOT include all information about conditions, illnesses, injuries, tests, procedures, treatments, therapies, discharge instructions or life-style choices that may apply to you. You must talk with your health care provider for complete information about your health and treatment options. This information should not be used to decide whether or not to accept your health care providers advice, instructions or recommendations. Only your health care provider has the knowledge and training to provide advice that is right for you.   Copyright   Copyright © 2021 [employer]. and its affiliates and/or licensors. All rights reserved.    Patient Education       Anxiety Discharge Instructions, Adult   About this topic   Anxiety can cause you to feel very worried. It can also cause physical symptoms like chest pain, stomach aches, or trouble sleeping. While mild anxiety is a normal response to stress, it can cause you problems in your everyday life. You may need follow-up care to help manage your anxiety. Anxiety happens in many forms, like:  Being scared all the time that something bad is going to happen. This is  generalized anxiety.  Strong bursts of fear where your body has signs that may feel like a heart attack. This is called a panic attack.  Upsetting thoughts that happen often. There is a need to repeat doing certain things to help get rid of the anxiety caused by these thoughts. The thoughts or actions may be about checking on things, touching things, or worry about germs. This is an obsessive-compulsive disorder.  Strong fear of an object, place, or condition. This is a phobia.  Fear that others think bad things about you or being put down by other people. This is social anxiety.  Nightmares, flashbacks, staying away from people, or having panic attacks when reminded of a shocking or hurtful time or place from the past. This is post-traumatic stress.  Anxiety disorder may be treated in many ways. Some kinds of treatment have you talk about your beliefs, fears, and worries. You may learn how certain thoughts or feelings can raise anxiety. You may also learn what steps to take to lower anxiety. Other kinds of treatment may have you look back on a hurtful event, sad memory, or something you are afraid of. The doctor will help you deal with the feelings that you may have. You may learn ways to cope with unwanted events or thoughts by looking at your fears in a way that feels safe.  What care is needed at home?   Ask your doctor what you need to do when you go home. Make sure you ask questions if you do not understand what the doctor says.  Set a time to talk with a counselor about your worries and feelings. This can help you with your anxiety.  Take care to follow all instructions when you take your medicines.  Limit alcohol and caffeine.  Learn ways to manage stress. Relaxation methods like reflection, deep breathing, and muscle relaxation may be helpful. Things like yoga, exercise, and kate chi are also good.  Talk about your feelings with family members and friends you trust. Talk to someone who can help you see how  your thoughts at certain times may raise your anxiety.     What follow-up care is needed?   Your doctor may ask you to make visits to the office to check on your progress. Be sure to keep these visits.  What drugs may be needed?   The doctor may order drugs to help the physical signs of anxiety. Make sure that you take the drugs as taught to you by the doctor. Talk with your doctor about any side effects and ask how long you should take the drug.  Will physical activity be limited?   You may take part in physical activities. Some people are limited because of their anxiety or fear. Talk with your doctor about the right amount of activity for you.  What changes to diet are needed?   Eat a variety of healthy foods and limit drinks with caffeine. You should avoid alcohol, energy drinks, and over-the-counter stimulants.  What problems could happen?   If your anxiety is not treated, it can result in:  Staying away from work or social events  Not being able to do everyday tasks  Keeping away from family and friends  What can be done to prevent this health problem?   Learn what events, people, or things upset you. Limit your contact with them.  Talk about your feelings. Talk to someone who can help you see how your thoughts at certain times may raise your anxiety.  Seek support from your friends and family. Find someone who calms you down. Ask if you can call them when you are getting anxious.  When do I need to call the doctor?   You feel you may harm yourself or someone else.  You can also call a mental health hotline for help.  You have any physical symptoms, such as chest pain, trouble breathing, or severe belly pain, that could be a sign of a serious problem.  If you are short of breath.  If you do not feel like you can be alone.  Teach Back: Helping You Understand   The Teach Back Method helps you understand the information we are giving you. After you talk with the staff, tell them in your own words what you  learned. This helps to make sure the staff has described each thing clearly. It also helps to explain things that may have been confusing. Before going home, make sure you can do these:  I can tell you about my condition and the drugs I need to take.  I can tell you what may help lower my anxiety.  I can tell you what I will do if it is hard to breathe or I have chest pain.  I can tell you what I will do if I do not feel safe or cannot be alone.  Where can I learn more?   [employer]  https://www.adolfo.org/Learn-More/Mental-Health-Conditions/Anxiety-Disorders   National Health Service  https://www.nhs.uk/conditions/generalised-anxiety-disorder/symptoms/   National [place] of Health ? [facility]  https://www.abhay.nih.gov/health/relieving-stress-anxiety-awhgasgmx-tnglhntule-jqbqkllaeo   National [place] of Mental Health  http://www.nimh.nih.gov/health/publications/anxiety-disorders/complete-index.shtml   Last Reviewed Date   2021-06-08  Consumer Information Use and Disclaimer   This information is not specific medical advice and does not replace information you receive from your health care provider. This is only a brief summary of general information. It does NOT include all information about conditions, illnesses, injuries, tests, procedures, treatments, therapies, discharge instructions or life-style choices that may apply to you. You must talk with your health care provider for complete information about your health and treatment options. This information should not be used to decide whether or not to accept your health care providers advice, instructions or recommendations. Only your health care provider has the knowledge and training to provide advice that is right for you.  Copyright   Copyright © 2021 UpToDate, Inc. and its affiliates and/or licensors. All rights reserved.    Patient Education       Depression Discharge Instructions, Adult   About this topic   Depression is different than normal sadness.  Depression can make it hard for you to work, sleep, and enjoy life. Signs of depression include:  No longer enjoying or caring about doing the things you used to.  Feeling sad, down, hopeless, or cranky most of the day, almost every day.  Losing or gaining weight without trying to do so.  Sleeping too much or too little.  Feeling tired or like you have no energy.  Feeling guilty or like you are worth nothing.  Forgetting things or feeling confused.  Moving and speaking more slowly than usual.  Acting restless or having trouble staying still.  Depression is caused by chemicals in your brain being out of balance. Treatment for depression may take a little while to start working.     What care is needed at home?   Ask your doctor what you need to do when you go home. Make sure you ask questions if you do not understand what the doctor says.  Go to counseling or support groups as suggested by your doctor.  Avoid beer, wine, and mixed drinks. Also avoid marijuana and illegal drugs.  Speak with trusted family and friends about your depression and how they can help.  Exercise each day. Try to spend time outside each day. Sunshine may make you feel better.  Have a regular sleep pattern and try to get 6 to 8 hours of sleep each night.  Learn how to cope with stress. Guided imagery, yoga, kate chi, or deep breathing may help reduce your signs.  What follow-up care is needed?   Depression needs to be watched closely. Your doctor may ask you to make visits to the office to check on your progress. Be sure to keep these visits.  What drugs may be needed?   The doctor may order drugs to:  Treat low mood  Improve sleep  Relieve distress and tension  Will physical activity be limited?   Physical activity like sports and exercise may help with recovery. Talk to your doctor about what activity will be good. Ask your doctor if you need help to manage any tiredness the drugs may cause.  What changes to diet are needed?   Eating a healthy  diet is important during this time. This means:  Eat whole grain foods and foods high in fiber.  Choose many different fruits and vegetables. Fresh or frozen is best.  Eat less solid fats like butter or margarine. Eat less fatty or processed foods.  Eat more low-fat or lean meats like chicken, fish, or turkey. Eat less red meat.  Avoid caffeine. Try not to drink soda, coffee, tea, or energy drinks.  What can be done to prevent this health problem?   Exercise each day.  Try to spend time outside each day. Sunshine can make you feel better.  Have a regular sleep pattern where you get 6 to 8 hours of sleep each night.  Learn how to cope with stress. Guided imagery, yoga, kate chi, or deep breathing may help relieve your signs.  Learn about depression and its signs. Then, you can get help early.  Join a support group. Learn how others are living well with depression.  When do I need to call the doctor?   You have thoughts of hurting yourself or someone else.  Your depression does not get better within 1 or 2 weeks.  Your depression is getting worse.  Your family and friends say they are worried about you.  You continue to have problems eating or sleeping.  You are functioning poorly at work, at home, or in school.  Teach Back: Helping You Understand   The Teach Back Method helps you understand the information we are giving you. After you talk with the staff, tell them in your own words what you learned. This helps to make sure the staff has described each thing clearly. It also helps to explain things that may have been confusing. Before going home, make sure you can do these:  I can tell you about my condition.  I can tell you what may help me relax and ease my stress.  I can tell you what I will do if I think about hurting myself or someone else or if my depression is not getting better.  Where can I learn more?   American Psychiatric  Association  https://www.psychiatry.org/patients-families/depression/what-is-depression   Centers for Disease Control and Prevention  https://www.cdc.gov/learnmorefeelbetter/programs/depression.htm   National Association of Mental Health  https://www.que.org/learn-more/mental-health-conditions/depression   [employer]Date  https://www.Guidecentral.Bunndle/contents/depression-in-adults-beyond-the-basics   Last Reviewed Date   2020-06-10  Consumer Information Use and Disclaimer   This information is not specific medical advice and does not replace information you receive from your health care provider. This is only a brief summary of general information. It does NOT include all information about conditions, illnesses, injuries, tests, procedures, treatments, therapies, discharge instructions or life-style choices that may apply to you. You must talk with your health care provider for complete information about your health and treatment options. This information should not be used to decide whether or not to accept your health care providers advice, instructions or recommendations. Only your health care provider has the knowledge and training to provide advice that is right for you.  Copyright   Copyright © 2021 UpToDate, Inc. and its affiliates and/or licensors. All rights reserved.          Risks, benefits, and alternatives discussed with patient, Patient verbalized understanding of discussed plan of care. Asked patient if any further questions, answered no.    Future Appointments   Date Time Provider Department Center   9/17/2024  9:00 AM Keeley Abdi NP [id number]   10/24/2024 11:00 AM Farheen Polo NP [id number] NP

## 2024-07-30 ENCOUNTER — PATIENT MESSAGE (OUTPATIENT)
Dept: SURGERY | Facility: CLINIC | Age: 39
End: 2024-07-30
Payer: COMMERCIAL

## 2024-08-06 ENCOUNTER — NUTRITION (OUTPATIENT)
Dept: SURGERY | Facility: CLINIC | Age: 39
End: 2024-08-06
Payer: COMMERCIAL

## 2024-08-06 VITALS — WEIGHT: 293 LBS | HEIGHT: 69 IN | BODY MASS INDEX: 43.4 KG/M2

## 2024-08-06 DIAGNOSIS — Z71.3 ENCOUNTER FOR NUTRITIONAL COUNSELING: ICD-10-CM

## 2024-08-06 DIAGNOSIS — E88.810 METABOLIC SYNDROME: ICD-10-CM

## 2024-08-06 PROCEDURE — 97803 MED NUTRITION INDIV SUBSEQ: CPT | Mod: ,,,

## 2024-09-08 ENCOUNTER — PATIENT MESSAGE (OUTPATIENT)
Dept: SURGERY | Facility: CLINIC | Age: 39
End: 2024-09-08
Payer: COMMERCIAL

## 2024-09-17 ENCOUNTER — OFFICE VISIT (OUTPATIENT)
Dept: OBSTETRICS AND GYNECOLOGY | Facility: CLINIC | Age: 39
End: 2024-09-17
Payer: COMMERCIAL

## 2024-09-17 VITALS
HEIGHT: 69 IN | BODY MASS INDEX: 43.4 KG/M2 | SYSTOLIC BLOOD PRESSURE: 139 MMHG | HEART RATE: 80 BPM | DIASTOLIC BLOOD PRESSURE: 85 MMHG | WEIGHT: 293 LBS

## 2024-09-17 DIAGNOSIS — E66.01 CLASS 3 SEVERE OBESITY DUE TO EXCESS CALORIES WITHOUT SERIOUS COMORBIDITY WITH BODY MASS INDEX (BMI) OF 60.0 TO 69.9 IN ADULT: ICD-10-CM

## 2024-09-17 DIAGNOSIS — Z01.419 WELL WOMAN EXAM WITH ROUTINE GYNECOLOGICAL EXAM: Primary | ICD-10-CM

## 2024-09-17 DIAGNOSIS — R87.619 ATYPICAL GLANDULAR CELLS ON CERVICAL PAP SMEAR: ICD-10-CM

## 2024-09-17 NOTE — PROGRESS NOTES
"    Subjective:       Patient ID: Viktoria Westfall is a 39 y.o. female.    Chief Complaint:  Well Woman      History of Present Illness   Presents for annual gyn exam. History and past labs reviewed with patient.    Denies any Complaints   Last pap AGC  Patient's last menstrual period was 2024 (approximate).      OB History          4    Para   3    Term   3            AB   1    Living   3         SAB        IAB        Ectopic        Multiple        Live Births   3                  Review of Systems  Review of Systems   Constitutional:  Negative for chills and fever.   Respiratory:  Negative for shortness of breath.    Cardiovascular:  Negative for chest pain.   Gastrointestinal:  Negative for abdominal pain, blood in stool, constipation, diarrhea, nausea, vomiting and reflux.   Genitourinary:  Negative for dysmenorrhea, dyspareunia, dysuria, hematuria, hot flashes, menorrhagia, menstrual problem, pelvic pain, vaginal bleeding, vaginal discharge, postcoital bleeding and vaginal dryness.   Musculoskeletal:  Negative for arthralgias and joint swelling.   Integumentary:  Negative for rash, hair changes, breast mass, nipple discharge and breast skin changes.   Psychiatric/Behavioral:  Negative for depression. The patient is not nervous/anxious.    Breast: Negative for asymmetry, lump, mass, nipple discharge and skin changes          Objective:     Vitals:    24 0850   BP: 139/85   Pulse: 80   Weight: (!) 170.3 kg (375 lb 8 oz)   Height: 5' 9" (1.753 m)        Physical Exam:   Constitutional: She is oriented to person, place, and time. She appears well-developed and well-nourished.    HENT:   Head: Normocephalic and atraumatic.    Eyes: Pupils are equal, round, and reactive to light. Conjunctivae are normal.    Neck: No thyromegaly present.    Cardiovascular:  Normal rate, regular rhythm and normal heart sounds.             Pulmonary/Chest: Effort normal and breath sounds normal.    "     Abdominal: Soft.     Genitourinary:    Inguinal canal, vagina, uterus, right adnexa, left adnexa and rectum normal.      Pelvic exam was performed with patient supine.   The external female genitalia was normal.   Genitalia hair distrobution normal .     Labial bartholins normal.Cervix is normal. No vaginal discharge in the vagina.    pap smear completedUterus is not tender.    Genitourinary Comments: Palpation difficult r/t habitus              Musculoskeletal: Normal range of motion and moves all extremeties.       Neurological: She is alert and oriented to person, place, and time. She has normal reflexes.    Skin: Skin is warm and dry.    Psychiatric: She has a normal mood and affect. Her behavior is normal. Judgment and thought content normal.       breast exam wnl- no nipple dc, skin changes, masses or lymph nodes palpated bilaterally    Female chaperone present during exam       Assessment:     1. Well woman exam with routine gynecological exam    2. Atypical glandular cells on cervical Pap smear    3. Class 3 severe obesity due to excess calories without serious comorbidity with body mass index (BMI) of 60.0 to 69.9 in adult              Plan:       Well woman exam with routine gynecological exam    Atypical glandular cells on cervical Pap smear  -     Liquid-based pap smear, screening    Class 3 severe obesity due to excess calories without serious comorbidity with body mass index (BMI) of 60.0 to 69.9 in adult       OTC Women's daily Multi vitamin  Healthy diet, exercise and lifestyle discussed  Risk assessment for inherited gyn cancer done   Patient was counseled today on A.C.S. Pap guidelines and recommendations for yearly pelvic exams, mammograms and monthly self breast exams; to see her PCP for other health maintenance.      Follow up in about 1 year (around 9/17/2025).

## 2024-09-19 LAB — Lab: NORMAL

## 2024-09-26 ENCOUNTER — PATIENT MESSAGE (OUTPATIENT)
Dept: SURGERY | Facility: CLINIC | Age: 39
End: 2024-09-26
Payer: COMMERCIAL